# Patient Record
Sex: MALE | Race: WHITE | NOT HISPANIC OR LATINO | Employment: FULL TIME | URBAN - METROPOLITAN AREA
[De-identification: names, ages, dates, MRNs, and addresses within clinical notes are randomized per-mention and may not be internally consistent; named-entity substitution may affect disease eponyms.]

---

## 2020-02-28 ENCOUNTER — TELEPHONE (OUTPATIENT)
Dept: ORTHOPEDICS | Facility: CLINIC | Age: 30
End: 2020-02-28

## 2020-02-28 DIAGNOSIS — M25.552 BILATERAL HIP PAIN: Primary | ICD-10-CM

## 2020-02-28 DIAGNOSIS — M25.551 BILATERAL HIP PAIN: Primary | ICD-10-CM

## 2020-02-28 NOTE — TELEPHONE ENCOUNTER
I spoke to the patient in regards to his ortho appointment. I confirmed the extremity that the patient was being seen for. I informed the patient to arrive 30 minutes prior to his scheduled appointment time for xrays.   The patient verbalized understanding. MS

## 2020-03-05 ENCOUNTER — HOSPITAL ENCOUNTER (OUTPATIENT)
Dept: RADIOLOGY | Facility: HOSPITAL | Age: 30
Discharge: HOME | End: 2020-03-05
Attending: ORTHOPAEDIC SURGERY
Payer: COMMERCIAL

## 2020-03-05 ENCOUNTER — OFFICE VISIT (OUTPATIENT)
Dept: ORTHOPEDICS | Facility: CLINIC | Age: 30
End: 2020-03-05
Payer: COMMERCIAL

## 2020-03-05 VITALS
SYSTOLIC BLOOD PRESSURE: 141 MMHG | HEIGHT: 70 IN | WEIGHT: 158 LBS | DIASTOLIC BLOOD PRESSURE: 69 MMHG | HEART RATE: 72 BPM | BODY MASS INDEX: 22.62 KG/M2

## 2020-03-05 DIAGNOSIS — M25.551 BILATERAL HIP PAIN: ICD-10-CM

## 2020-03-05 DIAGNOSIS — M25.552 BILATERAL HIP PAIN: ICD-10-CM

## 2020-03-05 DIAGNOSIS — S73.191D TEAR OF RIGHT ACETABULAR LABRUM, SUBSEQUENT ENCOUNTER: ICD-10-CM

## 2020-03-05 DIAGNOSIS — M25.851 FEMOROACETABULAR IMPINGEMENT OF RIGHT HIP: Primary | ICD-10-CM

## 2020-03-05 PROCEDURE — 73521 X-RAY EXAM HIPS BI 2 VIEWS: CPT | Mod: 26,,, | Performed by: RADIOLOGY

## 2020-03-05 PROCEDURE — 3008F PR BODY MASS INDEX (BMI) DOCUMENTED: ICD-10-PCS | Mod: CPTII,S$GLB,, | Performed by: ORTHOPAEDIC SURGERY

## 2020-03-05 PROCEDURE — 99214 PR OFFICE/OUTPT VISIT, EST, LEVL IV, 30-39 MIN: ICD-10-PCS | Mod: S$GLB,,, | Performed by: ORTHOPAEDIC SURGERY

## 2020-03-05 PROCEDURE — 73521 XR HIPS BILATERAL 2 VIEW INCL AP PELVIS: ICD-10-PCS | Mod: 26,,, | Performed by: RADIOLOGY

## 2020-03-05 PROCEDURE — 99999 PR PBB SHADOW E&M-EST. PATIENT-LVL III: ICD-10-PCS | Mod: PBBFAC,,, | Performed by: ORTHOPAEDIC SURGERY

## 2020-03-05 PROCEDURE — 3008F BODY MASS INDEX DOCD: CPT | Mod: CPTII,S$GLB,, | Performed by: ORTHOPAEDIC SURGERY

## 2020-03-05 PROCEDURE — 99214 OFFICE O/P EST MOD 30 MIN: CPT | Mod: S$GLB,,, | Performed by: ORTHOPAEDIC SURGERY

## 2020-03-05 PROCEDURE — 73521 X-RAY EXAM HIPS BI 2 VIEWS: CPT | Mod: TC

## 2020-03-05 PROCEDURE — 99999 PR PBB SHADOW E&M-EST. PATIENT-LVL III: CPT | Mod: PBBFAC,,, | Performed by: ORTHOPAEDIC SURGERY

## 2020-03-05 NOTE — PROGRESS NOTES
Patient ID: Clinton Combs  YOB: 1990  MRN: [id number]    Chief Complaint: Pain of the Right Hip    Referred By: [facility]     History of Present Illness: Clinton Combs is a right-hand dominant 29 y.o. male  with right hip/groin pain. Seen by us previously [facility]. Did PT with Yogesh Burns @ [facility] for 6 weeks, limited relief. Had ultrasound guided right hip corticosteroid injection that helped for a few weeks. Currently, pain is most severe with jumping or squats, cutting, or sharp turns.  Wants to play volleyball this spring.    Hip Pain    The pain is present in the right hip. This is a recurrent problem. The current episode started more than 1 year ago. The problem occurs intermittently. The problem has been gradually worsening. The quality of the pain is described as aching (stabbing). The pain is at a severity of 2/10. Pertinent negatives include no fever or numbness. The symptoms are aggravated by activity. He has tried injection treatment for the symptoms. The treatment provided mild relief. Physical therapy was effective.      Past Medical History:   History reviewed. No pertinent past medical history.  Past Surgical History:   Procedure Laterality Date    back injections       History reviewed. No pertinent family history.  Social History     Socioeconomic History    Marital status: Single     Spouse name: Not on file    Number of children: Not on file    Years of education: Not on file    Highest education level: Not on file   Occupational History    Not on file   Social Needs    Financial resource strain: Not on file    Food insecurity:     Worry: Not on file     Inability: Not on file    Transportation needs:     Medical: Not on file     Non-medical: Not on file   Tobacco Use    Smoking status: Never Smoker    Smokeless tobacco: Never Used   Substance and Sexual Activity    Alcohol use: Never     Frequency: Never    Drug  use: Never    Sexual activity: Not on file   Lifestyle    Physical activity:     Days per week: Not on file     Minutes per session: Not on file    Stress: Not on file   Relationships    Social connections:     Talks on phone: Not on file     Gets together: Not on file     Attends Yazidism service: Not on file     Active member of club or organization: Not on file     Attends meetings of clubs or organizations: Not on file     Relationship status: Not on file   Other Topics Concern    Not on file   Social History Narrative    Not on file       Review of patient's allergies indicates:  No Known Allergies  Review of Systems   Constitution: Negative for chills and fever.   HENT: Negative for ear discharge and hearing loss.    Eyes: Negative for blurred vision and visual disturbance.   Cardiovascular: Negative for chest pain and leg swelling.   Respiratory: Negative for cough and shortness of breath.    Endocrine: Negative for polyuria.   Hematologic/Lymphatic: Negative for bleeding problem.   Skin: Negative for rash.   Musculoskeletal: Positive for joint pain. Negative for back pain, joint swelling, muscle cramps and muscle weakness.   Gastrointestinal: Negative for nausea and vomiting.   Genitourinary: Negative for hematuria.   Neurological: Negative for loss of balance, numbness and paresthesias.   Psychiatric/Behavioral: Negative for altered mental status.       Physical Exam:   Body mass index is 22.67 kg/m².  Vitals:    03/05/20 0946   BP: (!) 141/69   Pulse: 72      GENERAL: Well appearing, appropriate for stated age, no acute distress.  CARDIOVASCULAR: Pulses regular by peripheral palpation.  PULMONARY: Respirations are even and non-labored.  NEURO: Awake, alert, and oriented x 3.  PSYCH: Mood & affect are appropriate.  HEENT: Head is normocephalic and atraumatic.  Ortho/SPM Exam  Right hip: + impingement test, negative GABY, flexion past 100, IR 20, ER 60, no pain with resisted hip flexion, no pain with  logroll, no pain with circumduction, no pain with abduction, NVID  Left hip: Range of motion within normal limits and painless. Intact motor and sensation. Good perfusion. No tenderness, gross deformity, gross instability, or skin lesions.  Gait: reciprocal heel-toe    Imaging:    X-Ray Hips Bilateral 2 View Incl AP Pelvis  Narrative: EXAMINATION:  XR HIPS BILATERAL 2 VIEW INCL AP PELVIS    CLINICAL HISTORY:  Pain in right hip    TECHNIQUE:  AP view of the pelvis and frogleg lateral views of both hips were performed.    COMPARISON:  None.    FINDINGS:  No acute osseous or soft tissue abnormality.  Impression: As above    Electronically signed by: Sumit Pizano MD  Date:    03/05/2020  Time:    09:45    Relevant imaging results reviewed and interpreted by me, discussed with the patient and / or family today.     Other Tests:     No other tests performed today.    Assessment:  Clinton Combs is a 29 y.o. male with right hip pain, previous patient [facility]    Encounter Diagnoses   Name Primary?    Femoroacetabular impingement of right hip Yes    Tear of right acetabular labrum, subsequent encounter         Plan:  · Schedule for US guided right hip injection  · Continue HEP based on previous PT  · Bring records from [facility] including clinic notes, cd with MRI and x-rays, and radiologist interpretation at next visit   Treatment plan was developed with input from the patient/family, and they expressed understanding and agreement with the plan. All questions were answered today.    Follow-up:  Ultrasound injection or sooner if there are any problems between now and then.    Disclaimer: This note was prepared using a voice recognition system and is likely to have sound alike errors within the text.

## 2020-03-05 NOTE — PATIENT INSTRUCTIONS
Dx: Right hip STEFF and labral tear    Plan:  · Schedule for US guided right hip injection  · Continue HEP based on previous PT  · Bring records from [facility] including clinic notes, cd with MRI and x-rays, and radiologist interpretation at next visit    Thank you for choosing [facility] and Dr. Denver Albert for your orthopedic & sports medicine care. It is our goal to provide you with exceptional care that will help keep you healthy, active, and get you back in the game.    If you felt that you received exemplary care today, please consider leaving us feedback on [employer] at [email or web address]     Please do not hesitate to reach out to us via email, phone, or MyChart with any questions, concerns, or feedback.    If you are experiencing pain/discomfort or have questions after 5pm and would like to be connected to the [facility] on call team, please call this number [phone] and specify which Orthopedics/Sports Medicine provider is treating you.

## 2020-03-11 ENCOUNTER — PATIENT MESSAGE (OUTPATIENT)
Dept: ORTHOPEDICS | Facility: CLINIC | Age: 30
End: 2020-03-11

## 2020-03-11 ENCOUNTER — PROCEDURE VISIT (OUTPATIENT)
Dept: ORTHOPEDICS | Facility: CLINIC | Age: 30
End: 2020-03-11
Payer: COMMERCIAL

## 2020-03-11 VITALS
HEIGHT: 70 IN | DIASTOLIC BLOOD PRESSURE: 80 MMHG | SYSTOLIC BLOOD PRESSURE: 118 MMHG | HEART RATE: 63 BPM | BODY MASS INDEX: 22.62 KG/M2 | WEIGHT: 158 LBS

## 2020-03-11 DIAGNOSIS — M25.851 HIP IMPINGEMENT SYNDROME, RIGHT: Primary | ICD-10-CM

## 2020-03-11 PROCEDURE — 20611 LARGE JOINT ASPIRATION/INJECTION: R HIP JOINT: ICD-10-PCS | Mod: RT,S$GLB,, | Performed by: ORTHOPAEDIC SURGERY

## 2020-03-11 PROCEDURE — 20611 DRAIN/INJ JOINT/BURSA W/US: CPT | Mod: RT,S$GLB,, | Performed by: ORTHOPAEDIC SURGERY

## 2020-03-11 RX ORDER — METHYLPREDNISOLONE ACETATE 80 MG/ML
80 INJECTION, SUSPENSION INTRA-ARTICULAR; INTRALESIONAL; INTRAMUSCULAR; SOFT TISSUE
Status: DISCONTINUED | OUTPATIENT
Start: 2020-03-11 | End: 2020-03-11 | Stop reason: HOSPADM

## 2020-03-11 RX ADMIN — METHYLPREDNISOLONE ACETATE 80 MG: 80 INJECTION, SUSPENSION INTRA-ARTICULAR; INTRALESIONAL; INTRAMUSCULAR; SOFT TISSUE at 08:03

## 2020-03-11 NOTE — PROCEDURES
Large Joint Aspiration/Injection: R hip joint  Performed by: Denver Albert MD  Authorized by: Denver Albert MD  Date/Time: 3/11/2020 8:00 AM    Consent Done?:  Yes (Verbal)  Indications:  diagnostic evaluation and pain  Timeout: Immediately prior to procedure a time out was called to verify the correct patient, procedure, equipment, support staff and site/side marked as required.  Prep:Patient was prepped and draped in the usual sterile fashion.        Anesthesia  Local anesthesia used  Anesthetic: bupivacaine 0.5% without epinephrine, lidocaine 1% without epinephrine and topical anesthetic    Details:   Needle size: 20 G  Ultrasonic Guidance for needle placement: Yes  Images are saved and documented.   Approach: lateral  Location:  Hip  Site:  R hip joint    Medications: 80 mg methylPREDNISolone acetate 80 mg/mL  Patient tolerance:  patient tolerated the procedure well with no immediate complications    Ultrasound Guided  2cc 1% lidocaine plain, 2cc 0.5% marcaine plain, 1cc 80mg methylprednisolone    ULTRASOUND NOTE: Due to the complex nature of the anatomy, and the need to insure the needle was placed precisely at the desired anatomical location, this procedure was performed with the assistance of ultrasound guidance. Imaging demonstrating needle trajectory and placement was saved on the ultrasound device for documentation purposes.

## 2021-02-11 ENCOUNTER — OFFICE VISIT (OUTPATIENT)
Dept: PRIMARY CARE CLINIC | Facility: CLINIC | Age: 31
End: 2021-02-11
Payer: COMMERCIAL

## 2021-02-11 ENCOUNTER — TELEPHONE (OUTPATIENT)
Dept: ORTHOPEDICS | Facility: CLINIC | Age: 31
End: 2021-02-11

## 2021-02-11 ENCOUNTER — LAB VISIT (OUTPATIENT)
Dept: LAB | Facility: HOSPITAL | Age: 31
End: 2021-02-11
Attending: FAMILY MEDICINE
Payer: COMMERCIAL

## 2021-02-11 VITALS
SYSTOLIC BLOOD PRESSURE: 126 MMHG | HEART RATE: 78 BPM | WEIGHT: 162.69 LBS | DIASTOLIC BLOOD PRESSURE: 84 MMHG | TEMPERATURE: 97 F | BODY MASS INDEX: 23.29 KG/M2 | HEIGHT: 70 IN

## 2021-02-11 DIAGNOSIS — M51.37 DDD (DEGENERATIVE DISC DISEASE), LUMBOSACRAL: ICD-10-CM

## 2021-02-11 DIAGNOSIS — Z00.00 ROUTINE GENERAL MEDICAL EXAMINATION AT A HEALTH CARE FACILITY: ICD-10-CM

## 2021-02-11 DIAGNOSIS — R63.4 WEIGHT LOSS: ICD-10-CM

## 2021-02-11 DIAGNOSIS — M25.512 ACUTE PAIN OF LEFT SHOULDER: ICD-10-CM

## 2021-02-11 DIAGNOSIS — R41.840 ATTENTION AND CONCENTRATION DEFICIT: Primary | ICD-10-CM

## 2021-02-11 LAB
BASOPHILS # BLD AUTO: 0.12 K/UL (ref 0–0.2)
BASOPHILS NFR BLD: 1.8 % (ref 0–1.9)
DIFFERENTIAL METHOD: ABNORMAL
EOSINOPHIL # BLD AUTO: 0.2 K/UL (ref 0–0.5)
EOSINOPHIL NFR BLD: 2.6 % (ref 0–8)
ERYTHROCYTE [DISTWIDTH] IN BLOOD BY AUTOMATED COUNT: 13.2 % (ref 11.5–14.5)
HCT VFR BLD AUTO: 49.4 % (ref 40–54)
HGB BLD-MCNC: 16.1 G/DL (ref 14–18)
IMM GRANULOCYTES # BLD AUTO: 0.04 K/UL (ref 0–0.04)
IMM GRANULOCYTES NFR BLD AUTO: 0.6 % (ref 0–0.5)
LYMPHOCYTES # BLD AUTO: 2.7 K/UL (ref 1–4.8)
LYMPHOCYTES NFR BLD: 40.9 % (ref 18–48)
MCH RBC QN AUTO: 30.4 PG (ref 27–31)
MCHC RBC AUTO-ENTMCNC: 32.6 G/DL (ref 32–36)
MCV RBC AUTO: 93 FL (ref 82–98)
MONOCYTES # BLD AUTO: 0.6 K/UL (ref 0.3–1)
MONOCYTES NFR BLD: 9.1 % (ref 4–15)
NEUTROPHILS # BLD AUTO: 2.9 K/UL (ref 1.8–7.7)
NEUTROPHILS NFR BLD: 45 % (ref 38–73)
NRBC BLD-RTO: 0 /100 WBC
PLATELET # BLD AUTO: 312 K/UL (ref 150–350)
PMV BLD AUTO: 9.7 FL (ref 9.2–12.9)
RBC # BLD AUTO: 5.3 M/UL (ref 4.6–6.2)
WBC # BLD AUTO: 6.51 K/UL (ref 3.9–12.7)

## 2021-02-11 PROCEDURE — 99999 PR PBB SHADOW E&M-EST. PATIENT-LVL III: ICD-10-PCS | Mod: PBBFAC,,, | Performed by: FAMILY MEDICINE

## 2021-02-11 PROCEDURE — 80053 COMPREHEN METABOLIC PANEL: CPT

## 2021-02-11 PROCEDURE — 99204 OFFICE O/P NEW MOD 45 MIN: CPT | Mod: 25,S$GLB,, | Performed by: FAMILY MEDICINE

## 2021-02-11 PROCEDURE — 85025 COMPLETE CBC W/AUTO DIFF WBC: CPT

## 2021-02-11 PROCEDURE — 80061 LIPID PANEL: CPT

## 2021-02-11 PROCEDURE — 90686 FLU VACCINE (QUAD) GREATER THAN OR EQUAL TO 3YO PRESERVATIVE FREE IM: ICD-10-PCS | Mod: S$GLB,,, | Performed by: FAMILY MEDICINE

## 2021-02-11 PROCEDURE — 99999 PR PBB SHADOW E&M-EST. PATIENT-LVL III: CPT | Mod: PBBFAC,,, | Performed by: FAMILY MEDICINE

## 2021-02-11 PROCEDURE — 90471 FLU VACCINE (QUAD) GREATER THAN OR EQUAL TO 3YO PRESERVATIVE FREE IM: ICD-10-PCS | Mod: S$GLB,,, | Performed by: FAMILY MEDICINE

## 2021-02-11 PROCEDURE — 1126F PR PAIN SEVERITY QUANTIFIED, NO PAIN PRESENT: ICD-10-PCS | Mod: S$GLB,,, | Performed by: FAMILY MEDICINE

## 2021-02-11 PROCEDURE — 3008F PR BODY MASS INDEX (BMI) DOCUMENTED: ICD-10-PCS | Mod: CPTII,S$GLB,, | Performed by: FAMILY MEDICINE

## 2021-02-11 PROCEDURE — 90686 IIV4 VACC NO PRSV 0.5 ML IM: CPT | Mod: S$GLB,,, | Performed by: FAMILY MEDICINE

## 2021-02-11 PROCEDURE — 3008F BODY MASS INDEX DOCD: CPT | Mod: CPTII,S$GLB,, | Performed by: FAMILY MEDICINE

## 2021-02-11 PROCEDURE — 99204 PR OFFICE/OUTPT VISIT, NEW, LEVL IV, 45-59 MIN: ICD-10-PCS | Mod: 25,S$GLB,, | Performed by: FAMILY MEDICINE

## 2021-02-11 PROCEDURE — 36415 COLL VENOUS BLD VENIPUNCTURE: CPT | Mod: PN

## 2021-02-11 PROCEDURE — 84443 ASSAY THYROID STIM HORMONE: CPT

## 2021-02-11 PROCEDURE — 1126F AMNT PAIN NOTED NONE PRSNT: CPT | Mod: S$GLB,,, | Performed by: FAMILY MEDICINE

## 2021-02-11 PROCEDURE — 90471 IMMUNIZATION ADMIN: CPT | Mod: S$GLB,,, | Performed by: FAMILY MEDICINE

## 2021-02-11 RX ORDER — SERTRALINE HYDROCHLORIDE 50 MG/1
25 TABLET, FILM COATED ORAL DAILY PRN
COMMUNITY
End: 2021-02-11

## 2021-02-12 ENCOUNTER — TELEPHONE (OUTPATIENT)
Dept: ORTHOPEDICS | Facility: CLINIC | Age: 31
End: 2021-02-12

## 2021-02-12 DIAGNOSIS — M25.512 ACUTE PAIN OF LEFT SHOULDER: Primary | ICD-10-CM

## 2021-02-12 LAB
ALBUMIN SERPL BCP-MCNC: 4.6 G/DL (ref 3.5–5.2)
ALP SERPL-CCNC: 62 U/L (ref 55–135)
ALT SERPL W/O P-5'-P-CCNC: 22 U/L (ref 10–44)
ANION GAP SERPL CALC-SCNC: 11 MMOL/L (ref 8–16)
AST SERPL-CCNC: 23 U/L (ref 10–40)
BILIRUB SERPL-MCNC: 0.4 MG/DL (ref 0.1–1)
BUN SERPL-MCNC: 13 MG/DL (ref 6–20)
CALCIUM SERPL-MCNC: 9.6 MG/DL (ref 8.7–10.5)
CHLORIDE SERPL-SCNC: 102 MMOL/L (ref 95–110)
CHOLEST SERPL-MCNC: 177 MG/DL (ref 120–199)
CHOLEST/HDLC SERPL: 2.4 {RATIO} (ref 2–5)
CO2 SERPL-SCNC: 26 MMOL/L (ref 23–29)
CREAT SERPL-MCNC: 1.1 MG/DL (ref 0.5–1.4)
EST. GFR  (AFRICAN AMERICAN): >60 ML/MIN/1.73 M^2
EST. GFR  (NON AFRICAN AMERICAN): >60 ML/MIN/1.73 M^2
GLUCOSE SERPL-MCNC: 77 MG/DL (ref 70–110)
HDLC SERPL-MCNC: 74 MG/DL (ref 40–75)
HDLC SERPL: 41.8 % (ref 20–50)
LDLC SERPL CALC-MCNC: 94.4 MG/DL (ref 63–159)
NONHDLC SERPL-MCNC: 103 MG/DL
POTASSIUM SERPL-SCNC: 4.4 MMOL/L (ref 3.5–5.1)
PROT SERPL-MCNC: 7.7 G/DL (ref 6–8.4)
SODIUM SERPL-SCNC: 139 MMOL/L (ref 136–145)
TRIGL SERPL-MCNC: 43 MG/DL (ref 30–150)
TSH SERPL DL<=0.005 MIU/L-ACNC: 1.86 UIU/ML (ref 0.4–4)

## 2021-02-17 ENCOUNTER — TELEPHONE (OUTPATIENT)
Dept: ORTHOPEDICS | Facility: CLINIC | Age: 31
End: 2021-02-17

## 2021-02-17 ENCOUNTER — OFFICE VISIT (OUTPATIENT)
Dept: ORTHOPEDICS | Facility: CLINIC | Age: 31
End: 2021-02-17
Payer: COMMERCIAL

## 2021-02-17 ENCOUNTER — HOSPITAL ENCOUNTER (OUTPATIENT)
Dept: RADIOLOGY | Facility: HOSPITAL | Age: 31
Discharge: HOME | End: 2021-02-17
Attending: PHYSICIAN ASSISTANT
Payer: COMMERCIAL

## 2021-02-17 VITALS — BODY MASS INDEX: 23.19 KG/M2 | HEIGHT: 70 IN | WEIGHT: 162 LBS

## 2021-02-17 DIAGNOSIS — M25.519 SHOULDER PAIN, UNSPECIFIED CHRONICITY, UNSPECIFIED LATERALITY: Primary | ICD-10-CM

## 2021-02-17 DIAGNOSIS — M25.512 ACUTE PAIN OF LEFT SHOULDER: ICD-10-CM

## 2021-02-17 PROCEDURE — 73030 X-RAY EXAM OF SHOULDER: CPT | Mod: TC,LT

## 2021-02-17 PROCEDURE — 73030 XR SHOULDER COMPLETE 2 OR MORE VIEWS LEFT: ICD-10-PCS | Mod: 26,LT,, | Performed by: RADIOLOGY

## 2021-02-17 PROCEDURE — 73030 X-RAY EXAM OF SHOULDER: CPT | Mod: 26,LT,, | Performed by: RADIOLOGY

## 2021-02-17 PROCEDURE — 3008F PR BODY MASS INDEX (BMI) DOCUMENTED: ICD-10-PCS | Mod: CPTII,S$GLB,, | Performed by: PHYSICIAN ASSISTANT

## 2021-02-17 PROCEDURE — 99999 PR PBB SHADOW E&M-EST. PATIENT-LVL IV: CPT | Mod: PBBFAC,,, | Performed by: PHYSICIAN ASSISTANT

## 2021-02-17 PROCEDURE — 99999 PR PBB SHADOW E&M-EST. PATIENT-LVL IV: ICD-10-PCS | Mod: PBBFAC,,, | Performed by: PHYSICIAN ASSISTANT

## 2021-02-17 PROCEDURE — 99213 OFFICE O/P EST LOW 20 MIN: CPT | Mod: S$GLB,,, | Performed by: PHYSICIAN ASSISTANT

## 2021-02-17 PROCEDURE — 1125F AMNT PAIN NOTED PAIN PRSNT: CPT | Mod: S$GLB,,, | Performed by: PHYSICIAN ASSISTANT

## 2021-02-17 PROCEDURE — 1125F PR PAIN SEVERITY QUANTIFIED, PAIN PRESENT: ICD-10-PCS | Mod: S$GLB,,, | Performed by: PHYSICIAN ASSISTANT

## 2021-02-17 PROCEDURE — 99213 PR OFFICE/OUTPT VISIT, EST, LEVL III, 20-29 MIN: ICD-10-PCS | Mod: S$GLB,,, | Performed by: PHYSICIAN ASSISTANT

## 2021-02-17 PROCEDURE — 3008F BODY MASS INDEX DOCD: CPT | Mod: CPTII,S$GLB,, | Performed by: PHYSICIAN ASSISTANT

## 2021-03-01 ENCOUNTER — OFFICE VISIT (OUTPATIENT)
Dept: PSYCHIATRY | Facility: CLINIC | Age: 31
End: 2021-03-01
Payer: COMMERCIAL

## 2021-03-01 VITALS — DIASTOLIC BLOOD PRESSURE: 90 MMHG | SYSTOLIC BLOOD PRESSURE: 143 MMHG | HEART RATE: 74 BPM

## 2021-03-01 DIAGNOSIS — F41.0 GENERALIZED ANXIETY DISORDER WITH PANIC ATTACKS: Primary | ICD-10-CM

## 2021-03-01 DIAGNOSIS — F41.9 ANXIETY: ICD-10-CM

## 2021-03-01 DIAGNOSIS — R41.840 ATTENTION AND CONCENTRATION DEFICIT: ICD-10-CM

## 2021-03-01 DIAGNOSIS — F41.1 GENERALIZED ANXIETY DISORDER WITH PANIC ATTACKS: Primary | ICD-10-CM

## 2021-03-01 PROCEDURE — 99999 PR PBB SHADOW E&M-EST. PATIENT-LVL III: ICD-10-PCS | Mod: PBBFAC,,, | Performed by: NURSE PRACTITIONER

## 2021-03-01 PROCEDURE — 99999 PR PBB SHADOW E&M-EST. PATIENT-LVL III: CPT | Mod: PBBFAC,,, | Performed by: NURSE PRACTITIONER

## 2021-03-01 PROCEDURE — 90792 PR PSYCHIATRIC DIAGNOSTIC EVALUATION W/MEDICAL SERVICES: ICD-10-PCS | Mod: S$GLB,,, | Performed by: NURSE PRACTITIONER

## 2021-03-01 PROCEDURE — 90792 PSYCH DIAG EVAL W/MED SRVCS: CPT | Mod: S$GLB,,, | Performed by: NURSE PRACTITIONER

## 2021-03-01 RX ORDER — HYDROXYZINE HYDROCHLORIDE 10 MG/1
TABLET, FILM COATED ORAL
Qty: 60 TABLET | Refills: 1 | Status: SHIPPED | OUTPATIENT
Start: 2021-03-01 | End: 2022-09-20 | Stop reason: ALTCHOICE

## 2021-03-05 ENCOUNTER — OFFICE VISIT (OUTPATIENT)
Dept: PSYCHIATRY | Facility: CLINIC | Age: 31
End: 2021-03-05
Payer: COMMERCIAL

## 2021-03-05 DIAGNOSIS — F90.0 ADHD (ATTENTION DEFICIT HYPERACTIVITY DISORDER), INATTENTIVE TYPE: Primary | ICD-10-CM

## 2021-03-05 DIAGNOSIS — F41.0 GENERALIZED ANXIETY DISORDER WITH PANIC ATTACKS: ICD-10-CM

## 2021-03-05 DIAGNOSIS — F41.9 ANXIETY: ICD-10-CM

## 2021-03-05 DIAGNOSIS — R41.840 ATTENTION AND CONCENTRATION DEFICIT: ICD-10-CM

## 2021-03-05 DIAGNOSIS — F41.1 GENERALIZED ANXIETY DISORDER WITH PANIC ATTACKS: ICD-10-CM

## 2021-03-05 PROCEDURE — 99214 OFFICE O/P EST MOD 30 MIN: CPT | Mod: 95,,, | Performed by: NURSE PRACTITIONER

## 2021-03-05 PROCEDURE — 99214 PR OFFICE/OUTPT VISIT, EST, LEVL IV, 30-39 MIN: ICD-10-PCS | Mod: 95,,, | Performed by: NURSE PRACTITIONER

## 2021-03-05 RX ORDER — LISDEXAMFETAMINE DIMESYLATE CAPSULES 10 MG/1
10 CAPSULE ORAL DAILY
Qty: 30 CAPSULE | Refills: 0 | Status: SHIPPED | OUTPATIENT
Start: 2021-03-05 | End: 2022-01-06 | Stop reason: SDUPTHER

## 2021-03-09 ENCOUNTER — HOSPITAL ENCOUNTER (OUTPATIENT)
Dept: RADIOLOGY | Facility: HOSPITAL | Age: 31
Discharge: HOME | End: 2021-03-09
Attending: PHYSICIAN ASSISTANT
Payer: COMMERCIAL

## 2021-03-09 DIAGNOSIS — M25.512 ACUTE PAIN OF LEFT SHOULDER: ICD-10-CM

## 2021-03-09 PROCEDURE — 25500020 PHARM REV CODE 255: Performed by: PHYSICIAN ASSISTANT

## 2021-03-09 PROCEDURE — 73222 MRI JOINT UPR EXTREM W/DYE: CPT | Mod: 26,LT,, | Performed by: RADIOLOGY

## 2021-03-09 PROCEDURE — 73040 CONTRAST X-RAY OF SHOULDER: CPT | Mod: 26,LT,, | Performed by: RADIOLOGY

## 2021-03-09 PROCEDURE — 23350 INJECTION FOR SHOULDER X-RAY: CPT | Mod: ,,, | Performed by: RADIOLOGY

## 2021-03-09 PROCEDURE — 73040 XR ARTHROGRAM SHOULDER LEFT WITH MRI TO FOLLOW: ICD-10-PCS | Mod: 26,LT,, | Performed by: RADIOLOGY

## 2021-03-09 PROCEDURE — 73222 MRI ARTHROGRAM SHOULDER WITH CONTRAST LEFT: ICD-10-PCS | Mod: 26,LT,, | Performed by: RADIOLOGY

## 2021-03-09 PROCEDURE — 23350 INJECTION FOR SHOULDER X-RAY: CPT

## 2021-03-09 PROCEDURE — 23350 XR ARTHROGRAM SHOULDER LEFT WITH MRI TO FOLLOW: ICD-10-PCS | Mod: ,,, | Performed by: RADIOLOGY

## 2021-03-09 PROCEDURE — A9585 GADOBUTROL INJECTION: HCPCS | Performed by: PHYSICIAN ASSISTANT

## 2021-03-09 PROCEDURE — 73222 MRI JOINT UPR EXTREM W/DYE: CPT | Mod: TC,LT

## 2021-03-09 RX ORDER — LIDOCAINE HYDROCHLORIDE 10 MG/ML
1 INJECTION INFILTRATION; PERINEURAL ONCE
Status: DISCONTINUED | OUTPATIENT
Start: 2021-03-09 | End: 2021-03-10 | Stop reason: HOSPADM

## 2021-03-09 RX ORDER — GADOBUTROL 604.72 MG/ML
0.1 INJECTION INTRAVENOUS
Status: COMPLETED | OUTPATIENT
Start: 2021-03-09 | End: 2021-03-09

## 2021-03-09 RX ADMIN — GADOBUTROL 0.1 ML: 604.72 INJECTION INTRAVENOUS at 12:03

## 2021-03-09 RX ADMIN — IOHEXOL 10 ML: 350 INJECTION, SOLUTION INTRAVENOUS at 12:03

## 2021-03-11 ENCOUNTER — OFFICE VISIT (OUTPATIENT)
Dept: ORTHOPEDICS | Facility: CLINIC | Age: 31
End: 2021-03-11
Payer: COMMERCIAL

## 2021-03-11 DIAGNOSIS — M75.22 BICEPS TENDONITIS ON LEFT: Primary | ICD-10-CM

## 2021-03-11 PROCEDURE — 1125F AMNT PAIN NOTED PAIN PRSNT: CPT | Mod: S$GLB,,, | Performed by: ORTHOPAEDIC SURGERY

## 2021-03-11 PROCEDURE — 99999 PR PBB SHADOW E&M-EST. PATIENT-LVL III: ICD-10-PCS | Mod: PBBFAC,,, | Performed by: ORTHOPAEDIC SURGERY

## 2021-03-11 PROCEDURE — 1125F PR PAIN SEVERITY QUANTIFIED, PAIN PRESENT: ICD-10-PCS | Mod: S$GLB,,, | Performed by: ORTHOPAEDIC SURGERY

## 2021-03-11 PROCEDURE — 99213 OFFICE O/P EST LOW 20 MIN: CPT | Mod: S$GLB,,, | Performed by: ORTHOPAEDIC SURGERY

## 2021-03-11 PROCEDURE — 99213 PR OFFICE/OUTPT VISIT, EST, LEVL III, 20-29 MIN: ICD-10-PCS | Mod: S$GLB,,, | Performed by: ORTHOPAEDIC SURGERY

## 2021-03-11 PROCEDURE — 99999 PR PBB SHADOW E&M-EST. PATIENT-LVL III: CPT | Mod: PBBFAC,,, | Performed by: ORTHOPAEDIC SURGERY

## 2021-03-16 ENCOUNTER — TELEPHONE (OUTPATIENT)
Dept: ORTHOPEDICS | Facility: CLINIC | Age: 31
End: 2021-03-16

## 2021-03-27 ENCOUNTER — IMMUNIZATION (OUTPATIENT)
Dept: PRIMARY CARE CLINIC | Facility: CLINIC | Age: 31
End: 2021-03-27
Payer: COMMERCIAL

## 2021-03-27 DIAGNOSIS — Z23 NEED FOR VACCINATION: Primary | ICD-10-CM

## 2021-03-27 PROCEDURE — 0011A PR IMMUNIZ ADMIN, SARS-COV-2 COVID-19 VACC, 100MCG/0.5ML, 1ST DOSE: ICD-10-PCS | Mod: CV19,S$GLB,, | Performed by: INTERNAL MEDICINE

## 2021-03-27 PROCEDURE — 91301 PR SARS-COV-2 COVID-19 VACCINE, NO PRSV, 100MCG/0.5ML, IM: CPT | Mod: S$GLB,,, | Performed by: INTERNAL MEDICINE

## 2021-03-27 PROCEDURE — 0011A PR IMMUNIZ ADMIN, SARS-COV-2 COVID-19 VACC, 100MCG/0.5ML, 1ST DOSE: CPT | Mod: CV19,S$GLB,, | Performed by: INTERNAL MEDICINE

## 2021-03-27 PROCEDURE — 91301 PR SARS-COV-2 COVID-19 VACCINE, NO PRSV, 100MCG/0.5ML, IM: ICD-10-PCS | Mod: S$GLB,,, | Performed by: INTERNAL MEDICINE

## 2021-03-27 RX ADMIN — Medication 0.5 ML: at 10:03

## 2021-04-25 ENCOUNTER — IMMUNIZATION (OUTPATIENT)
Dept: PRIMARY CARE CLINIC | Facility: CLINIC | Age: 31
End: 2021-04-25
Payer: COMMERCIAL

## 2021-04-25 DIAGNOSIS — Z23 NEED FOR VACCINATION: Primary | ICD-10-CM

## 2021-04-25 PROCEDURE — 91301 PR SARS-COV-2 COVID-19 VACCINE, NO PRSV, 100MCG/0.5ML, IM: CPT | Mod: S$GLB,,, | Performed by: INTERNAL MEDICINE

## 2021-04-25 PROCEDURE — 0012A PR IMMUNIZ ADMIN, SARS-COV-2 COVID-19 VACC, 100MCG/0.5ML, 2ND DOSE: CPT | Mod: CV19,S$GLB,, | Performed by: INTERNAL MEDICINE

## 2021-04-25 PROCEDURE — 0012A PR IMMUNIZ ADMIN, SARS-COV-2 COVID-19 VACC, 100MCG/0.5ML, 2ND DOSE: ICD-10-PCS | Mod: CV19,S$GLB,, | Performed by: INTERNAL MEDICINE

## 2021-04-25 PROCEDURE — 91301 PR SARS-COV-2 COVID-19 VACCINE, NO PRSV, 100MCG/0.5ML, IM: ICD-10-PCS | Mod: S$GLB,,, | Performed by: INTERNAL MEDICINE

## 2021-04-25 RX ADMIN — Medication 0.5 ML: at 10:04

## 2021-04-27 ENCOUNTER — TELEPHONE (OUTPATIENT)
Dept: ORTHOPEDICS | Facility: CLINIC | Age: 31
End: 2021-04-27

## 2021-05-06 ENCOUNTER — OFFICE VISIT (OUTPATIENT)
Dept: ORTHOPEDICS | Facility: CLINIC | Age: 31
End: 2021-05-06
Payer: COMMERCIAL

## 2021-05-06 VITALS — BODY MASS INDEX: 23.19 KG/M2 | WEIGHT: 162 LBS | HEIGHT: 70 IN

## 2021-05-06 DIAGNOSIS — M75.22 BICEPS TENDONITIS ON LEFT: Primary | ICD-10-CM

## 2021-05-06 PROCEDURE — 99213 PR OFFICE/OUTPT VISIT, EST, LEVL III, 20-29 MIN: ICD-10-PCS | Mod: S$GLB,,, | Performed by: ORTHOPAEDIC SURGERY

## 2021-05-06 PROCEDURE — 3008F PR BODY MASS INDEX (BMI) DOCUMENTED: ICD-10-PCS | Mod: CPTII,S$GLB,, | Performed by: ORTHOPAEDIC SURGERY

## 2021-05-06 PROCEDURE — 1125F PR PAIN SEVERITY QUANTIFIED, PAIN PRESENT: ICD-10-PCS | Mod: S$GLB,,, | Performed by: ORTHOPAEDIC SURGERY

## 2021-05-06 PROCEDURE — 99999 PR PBB SHADOW E&M-EST. PATIENT-LVL III: ICD-10-PCS | Mod: PBBFAC,,, | Performed by: ORTHOPAEDIC SURGERY

## 2021-05-06 PROCEDURE — 99999 PR PBB SHADOW E&M-EST. PATIENT-LVL III: CPT | Mod: PBBFAC,,, | Performed by: ORTHOPAEDIC SURGERY

## 2021-05-06 PROCEDURE — 99213 OFFICE O/P EST LOW 20 MIN: CPT | Mod: S$GLB,,, | Performed by: ORTHOPAEDIC SURGERY

## 2021-05-06 PROCEDURE — 1125F AMNT PAIN NOTED PAIN PRSNT: CPT | Mod: S$GLB,,, | Performed by: ORTHOPAEDIC SURGERY

## 2021-05-06 PROCEDURE — 3008F BODY MASS INDEX DOCD: CPT | Mod: CPTII,S$GLB,, | Performed by: ORTHOPAEDIC SURGERY

## 2021-05-11 ENCOUNTER — OFFICE VISIT (OUTPATIENT)
Dept: ORTHOPEDICS | Facility: CLINIC | Age: 31
End: 2021-05-11
Payer: COMMERCIAL

## 2021-05-11 VITALS — RESPIRATION RATE: 20 BRPM | BODY MASS INDEX: 23.2 KG/M2 | HEIGHT: 70 IN | WEIGHT: 162.06 LBS

## 2021-05-11 DIAGNOSIS — M75.22 BICEPS TENDONITIS ON LEFT: Primary | ICD-10-CM

## 2021-05-11 PROCEDURE — 76942 TENDON SHEATH: ICD-10-PCS | Mod: 26,S$GLB,, | Performed by: STUDENT IN AN ORGANIZED HEALTH CARE EDUCATION/TRAINING PROGRAM

## 2021-05-11 PROCEDURE — 76942 ECHO GUIDE FOR BIOPSY: CPT | Mod: 26,S$GLB,, | Performed by: STUDENT IN AN ORGANIZED HEALTH CARE EDUCATION/TRAINING PROGRAM

## 2021-05-11 PROCEDURE — 99999 PR PBB SHADOW E&M-EST. PATIENT-LVL III: ICD-10-PCS | Mod: PBBFAC,,, | Performed by: STUDENT IN AN ORGANIZED HEALTH CARE EDUCATION/TRAINING PROGRAM

## 2021-05-11 PROCEDURE — 1125F PR PAIN SEVERITY QUANTIFIED, PAIN PRESENT: ICD-10-PCS | Mod: S$GLB,,, | Performed by: STUDENT IN AN ORGANIZED HEALTH CARE EDUCATION/TRAINING PROGRAM

## 2021-05-11 PROCEDURE — 1125F AMNT PAIN NOTED PAIN PRSNT: CPT | Mod: S$GLB,,, | Performed by: STUDENT IN AN ORGANIZED HEALTH CARE EDUCATION/TRAINING PROGRAM

## 2021-05-11 PROCEDURE — 99214 PR OFFICE/OUTPT VISIT, EST, LEVL IV, 30-39 MIN: ICD-10-PCS | Mod: 25,S$GLB,, | Performed by: STUDENT IN AN ORGANIZED HEALTH CARE EDUCATION/TRAINING PROGRAM

## 2021-05-11 PROCEDURE — 99999 PR PBB SHADOW E&M-EST. PATIENT-LVL III: CPT | Mod: PBBFAC,,, | Performed by: STUDENT IN AN ORGANIZED HEALTH CARE EDUCATION/TRAINING PROGRAM

## 2021-05-11 PROCEDURE — 20550 NJX 1 TENDON SHEATH/LIGAMENT: CPT | Mod: LT,S$GLB,, | Performed by: STUDENT IN AN ORGANIZED HEALTH CARE EDUCATION/TRAINING PROGRAM

## 2021-05-11 PROCEDURE — 3008F BODY MASS INDEX DOCD: CPT | Mod: CPTII,S$GLB,, | Performed by: STUDENT IN AN ORGANIZED HEALTH CARE EDUCATION/TRAINING PROGRAM

## 2021-05-11 PROCEDURE — 99214 OFFICE O/P EST MOD 30 MIN: CPT | Mod: 25,S$GLB,, | Performed by: STUDENT IN AN ORGANIZED HEALTH CARE EDUCATION/TRAINING PROGRAM

## 2021-05-11 PROCEDURE — 20550 TENDON SHEATH: ICD-10-PCS | Mod: LT,S$GLB,, | Performed by: STUDENT IN AN ORGANIZED HEALTH CARE EDUCATION/TRAINING PROGRAM

## 2021-05-11 PROCEDURE — 3008F PR BODY MASS INDEX (BMI) DOCUMENTED: ICD-10-PCS | Mod: CPTII,S$GLB,, | Performed by: STUDENT IN AN ORGANIZED HEALTH CARE EDUCATION/TRAINING PROGRAM

## 2021-05-11 RX ORDER — BETAMETHASONE SODIUM PHOSPHATE AND BETAMETHASONE ACETATE 3; 3 MG/ML; MG/ML
3 INJECTION, SUSPENSION INTRA-ARTICULAR; INTRALESIONAL; INTRAMUSCULAR; SOFT TISSUE
Status: DISCONTINUED | OUTPATIENT
Start: 2021-05-11 | End: 2021-05-11 | Stop reason: HOSPADM

## 2021-05-11 RX ADMIN — BETAMETHASONE SODIUM PHOSPHATE AND BETAMETHASONE ACETATE 3 MG: 3; 3 INJECTION, SUSPENSION INTRA-ARTICULAR; INTRALESIONAL; INTRAMUSCULAR; SOFT TISSUE at 02:05

## 2021-06-07 ENCOUNTER — HOSPITAL ENCOUNTER (OUTPATIENT)
Dept: RADIOLOGY | Facility: HOSPITAL | Age: 31
Discharge: HOME | End: 2021-06-07
Attending: PODIATRIST
Payer: COMMERCIAL

## 2021-06-07 ENCOUNTER — OFFICE VISIT (OUTPATIENT)
Dept: PODIATRY | Facility: CLINIC | Age: 31
End: 2021-06-07
Payer: COMMERCIAL

## 2021-06-07 VITALS
SYSTOLIC BLOOD PRESSURE: 125 MMHG | HEART RATE: 68 BPM | WEIGHT: 162 LBS | DIASTOLIC BLOOD PRESSURE: 78 MMHG | BODY MASS INDEX: 23.19 KG/M2 | HEIGHT: 70 IN

## 2021-06-07 DIAGNOSIS — M25.572 TOE JOINT PAIN, LEFT: ICD-10-CM

## 2021-06-07 DIAGNOSIS — Q79.9 ACCESSORY OSSIFICATION CENTER: Primary | ICD-10-CM

## 2021-06-07 PROCEDURE — 73630 XR FOOT COMPLETE 3 VIEW LEFT: ICD-10-PCS | Mod: 26,LT,, | Performed by: RADIOLOGY

## 2021-06-07 PROCEDURE — 99203 OFFICE O/P NEW LOW 30 MIN: CPT | Mod: S$GLB,,, | Performed by: PODIATRIST

## 2021-06-07 PROCEDURE — 99999 PR PBB SHADOW E&M-EST. PATIENT-LVL III: CPT | Mod: PBBFAC,,, | Performed by: PODIATRIST

## 2021-06-07 PROCEDURE — 99203 PR OFFICE/OUTPT VISIT, NEW, LEVL III, 30-44 MIN: ICD-10-PCS | Mod: S$GLB,,, | Performed by: PODIATRIST

## 2021-06-07 PROCEDURE — 1125F PR PAIN SEVERITY QUANTIFIED, PAIN PRESENT: ICD-10-PCS | Mod: S$GLB,,, | Performed by: PODIATRIST

## 2021-06-07 PROCEDURE — 1125F AMNT PAIN NOTED PAIN PRSNT: CPT | Mod: S$GLB,,, | Performed by: PODIATRIST

## 2021-06-07 PROCEDURE — 73630 X-RAY EXAM OF FOOT: CPT | Mod: 26,LT,, | Performed by: RADIOLOGY

## 2021-06-07 PROCEDURE — 3008F BODY MASS INDEX DOCD: CPT | Mod: CPTII,S$GLB,, | Performed by: PODIATRIST

## 2021-06-07 PROCEDURE — 3008F PR BODY MASS INDEX (BMI) DOCUMENTED: ICD-10-PCS | Mod: CPTII,S$GLB,, | Performed by: PODIATRIST

## 2021-06-07 PROCEDURE — 99999 PR PBB SHADOW E&M-EST. PATIENT-LVL III: ICD-10-PCS | Mod: PBBFAC,,, | Performed by: PODIATRIST

## 2021-06-07 PROCEDURE — 73630 X-RAY EXAM OF FOOT: CPT | Mod: TC,LT

## 2021-09-24 DIAGNOSIS — M25.561 PAIN IN BOTH KNEES, UNSPECIFIED CHRONICITY: Primary | ICD-10-CM

## 2021-09-24 DIAGNOSIS — M25.562 PAIN IN BOTH KNEES, UNSPECIFIED CHRONICITY: Primary | ICD-10-CM

## 2021-09-28 ENCOUNTER — OFFICE VISIT (OUTPATIENT)
Dept: SPORTS MEDICINE | Facility: CLINIC | Age: 31
End: 2021-09-28
Payer: COMMERCIAL

## 2021-09-28 ENCOUNTER — HOSPITAL ENCOUNTER (OUTPATIENT)
Dept: RADIOLOGY | Facility: HOSPITAL | Age: 31
Discharge: HOME | End: 2021-09-28
Attending: STUDENT IN AN ORGANIZED HEALTH CARE EDUCATION/TRAINING PROGRAM
Payer: COMMERCIAL

## 2021-09-28 VITALS — WEIGHT: 159.63 LBS | HEIGHT: 70 IN | BODY MASS INDEX: 22.85 KG/M2 | RESPIRATION RATE: 20 BRPM

## 2021-09-28 DIAGNOSIS — M25.561 PAIN IN BOTH KNEES, UNSPECIFIED CHRONICITY: ICD-10-CM

## 2021-09-28 DIAGNOSIS — M25.562 ACUTE PAIN OF LEFT KNEE: ICD-10-CM

## 2021-09-28 DIAGNOSIS — M25.562 PAIN IN BOTH KNEES, UNSPECIFIED CHRONICITY: ICD-10-CM

## 2021-09-28 DIAGNOSIS — M76.892 HAMSTRING TENDINITIS OF LEFT THIGH: Primary | ICD-10-CM

## 2021-09-28 PROCEDURE — 99999 PR PBB SHADOW E&M-EST. PATIENT-LVL III: CPT | Mod: PBBFAC,,, | Performed by: STUDENT IN AN ORGANIZED HEALTH CARE EDUCATION/TRAINING PROGRAM

## 2021-09-28 PROCEDURE — 73564 XR KNEE ORTHO BILAT WITH FLEXION: ICD-10-PCS | Mod: 26,,, | Performed by: RADIOLOGY

## 2021-09-28 PROCEDURE — 99213 OFFICE O/P EST LOW 20 MIN: CPT | Mod: 25,S$GLB,, | Performed by: STUDENT IN AN ORGANIZED HEALTH CARE EDUCATION/TRAINING PROGRAM

## 2021-09-28 PROCEDURE — 73564 X-RAY EXAM KNEE 4 OR MORE: CPT | Mod: TC,50

## 2021-09-28 PROCEDURE — 3008F PR BODY MASS INDEX (BMI) DOCUMENTED: ICD-10-PCS | Mod: CPTII,S$GLB,, | Performed by: STUDENT IN AN ORGANIZED HEALTH CARE EDUCATION/TRAINING PROGRAM

## 2021-09-28 PROCEDURE — 76882 US LMTD JT/FCL EVL NVASC XTR: CPT | Mod: 26,S$GLB,, | Performed by: STUDENT IN AN ORGANIZED HEALTH CARE EDUCATION/TRAINING PROGRAM

## 2021-09-28 PROCEDURE — 99999 PR PBB SHADOW E&M-EST. PATIENT-LVL III: ICD-10-PCS | Mod: PBBFAC,,, | Performed by: STUDENT IN AN ORGANIZED HEALTH CARE EDUCATION/TRAINING PROGRAM

## 2021-09-28 PROCEDURE — 73564 X-RAY EXAM KNEE 4 OR MORE: CPT | Mod: 26,,, | Performed by: RADIOLOGY

## 2021-09-28 PROCEDURE — 99213 PR OFFICE/OUTPT VISIT, EST, LEVL III, 20-29 MIN: ICD-10-PCS | Mod: 25,S$GLB,, | Performed by: STUDENT IN AN ORGANIZED HEALTH CARE EDUCATION/TRAINING PROGRAM

## 2021-09-28 PROCEDURE — 76882 PR  US,EXTREMITY,NONVASCULAR,REAL-TIME IMAGE,LIMITED: ICD-10-PCS | Mod: 26,S$GLB,, | Performed by: STUDENT IN AN ORGANIZED HEALTH CARE EDUCATION/TRAINING PROGRAM

## 2021-09-28 PROCEDURE — 3008F BODY MASS INDEX DOCD: CPT | Mod: CPTII,S$GLB,, | Performed by: STUDENT IN AN ORGANIZED HEALTH CARE EDUCATION/TRAINING PROGRAM

## 2021-09-30 DIAGNOSIS — M25.551 RIGHT HIP PAIN: Primary | ICD-10-CM

## 2021-10-01 ENCOUNTER — HOSPITAL ENCOUNTER (OUTPATIENT)
Dept: RADIOLOGY | Facility: HOSPITAL | Age: 31
Discharge: HOME | End: 2021-10-01
Attending: ORTHOPAEDIC SURGERY
Payer: COMMERCIAL

## 2021-10-01 ENCOUNTER — OFFICE VISIT (OUTPATIENT)
Dept: ORTHOPEDICS | Facility: CLINIC | Age: 31
End: 2021-10-01
Payer: COMMERCIAL

## 2021-10-01 VITALS — HEIGHT: 70 IN | WEIGHT: 159.63 LBS | BODY MASS INDEX: 22.85 KG/M2

## 2021-10-01 DIAGNOSIS — M25.851 FEMOROACETABULAR IMPINGEMENT OF RIGHT HIP: Primary | ICD-10-CM

## 2021-10-01 DIAGNOSIS — M25.551 RIGHT HIP PAIN: ICD-10-CM

## 2021-10-01 PROCEDURE — 73502 XR HIP WITH PELVIS WHEN PERFORMED, 2 OR 3  VIEWS RIGHT: ICD-10-PCS | Mod: 26,RT,, | Performed by: RADIOLOGY

## 2021-10-01 PROCEDURE — 99999 PR PBB SHADOW E&M-EST. PATIENT-LVL III: ICD-10-PCS | Mod: PBBFAC,,, | Performed by: ORTHOPAEDIC SURGERY

## 2021-10-01 PROCEDURE — 3008F PR BODY MASS INDEX (BMI) DOCUMENTED: ICD-10-PCS | Mod: CPTII,S$GLB,, | Performed by: ORTHOPAEDIC SURGERY

## 2021-10-01 PROCEDURE — 1159F MED LIST DOCD IN RCRD: CPT | Mod: CPTII,S$GLB,, | Performed by: ORTHOPAEDIC SURGERY

## 2021-10-01 PROCEDURE — 73502 X-RAY EXAM HIP UNI 2-3 VIEWS: CPT | Mod: 26,RT,, | Performed by: RADIOLOGY

## 2021-10-01 PROCEDURE — 99999 PR PBB SHADOW E&M-EST. PATIENT-LVL III: CPT | Mod: PBBFAC,,, | Performed by: ORTHOPAEDIC SURGERY

## 2021-10-01 PROCEDURE — 73502 X-RAY EXAM HIP UNI 2-3 VIEWS: CPT | Mod: TC,RT

## 2021-10-01 PROCEDURE — 1159F PR MEDICATION LIST DOCUMENTED IN MEDICAL RECORD: ICD-10-PCS | Mod: CPTII,S$GLB,, | Performed by: ORTHOPAEDIC SURGERY

## 2021-10-01 PROCEDURE — 3008F BODY MASS INDEX DOCD: CPT | Mod: CPTII,S$GLB,, | Performed by: ORTHOPAEDIC SURGERY

## 2021-10-01 PROCEDURE — 99214 OFFICE O/P EST MOD 30 MIN: CPT | Mod: S$GLB,,, | Performed by: ORTHOPAEDIC SURGERY

## 2021-10-01 PROCEDURE — 99214 PR OFFICE/OUTPT VISIT, EST, LEVL IV, 30-39 MIN: ICD-10-PCS | Mod: S$GLB,,, | Performed by: ORTHOPAEDIC SURGERY

## 2021-10-05 ENCOUNTER — TELEPHONE (OUTPATIENT)
Dept: ORTHOPEDICS | Facility: CLINIC | Age: 31
End: 2021-10-05

## 2021-10-28 ENCOUNTER — HOSPITAL ENCOUNTER (OUTPATIENT)
Dept: RADIOLOGY | Facility: HOSPITAL | Age: 31
Discharge: HOME | End: 2021-10-28
Attending: ORTHOPAEDIC SURGERY
Payer: COMMERCIAL

## 2021-10-28 DIAGNOSIS — M25.851 FEMOROACETABULAR IMPINGEMENT OF RIGHT HIP: ICD-10-CM

## 2021-10-28 PROCEDURE — 73722 MRI JOINT OF LWR EXTR W/DYE: CPT | Mod: TC,RT

## 2021-10-28 PROCEDURE — 25500020 PHARM REV CODE 255: Performed by: ORTHOPAEDIC SURGERY

## 2021-10-28 PROCEDURE — 27093 XR ARTHROGRAM HIP RIGHT WITH MRI TO FOLLOW: ICD-10-PCS | Mod: ,,, | Performed by: RADIOLOGY

## 2021-10-28 PROCEDURE — 73525 CONTRAST X-RAY OF HIP: CPT | Mod: TC,RT

## 2021-10-28 PROCEDURE — 73722 MRI ARTHROGRAM HIP RIGHT: ICD-10-PCS | Mod: 26,RT,, | Performed by: RADIOLOGY

## 2021-10-28 PROCEDURE — A9585 GADOBUTROL INJECTION: HCPCS | Performed by: ORTHOPAEDIC SURGERY

## 2021-10-28 PROCEDURE — 27093 INJECTION FOR HIP X-RAY: CPT

## 2021-10-28 PROCEDURE — 73525 CONTRAST X-RAY OF HIP: CPT | Mod: 26,RT,, | Performed by: RADIOLOGY

## 2021-10-28 PROCEDURE — 73722 MRI JOINT OF LWR EXTR W/DYE: CPT | Mod: 26,RT,, | Performed by: RADIOLOGY

## 2021-10-28 PROCEDURE — 73525 XR ARTHROGRAM HIP RIGHT WITH MRI TO FOLLOW: ICD-10-PCS | Mod: 26,RT,, | Performed by: RADIOLOGY

## 2021-10-28 PROCEDURE — 27093 INJECTION FOR HIP X-RAY: CPT | Mod: ,,, | Performed by: RADIOLOGY

## 2021-10-28 RX ORDER — GADOBUTROL 604.72 MG/ML
0.1 INJECTION INTRAVENOUS
Status: COMPLETED | OUTPATIENT
Start: 2021-10-28 | End: 2021-10-28

## 2021-10-28 RX ADMIN — IOHEXOL 10 ML: 350 INJECTION, SOLUTION INTRAVENOUS at 12:10

## 2021-10-28 RX ADMIN — GADOBUTROL 0.1 ML: 604.72 INJECTION INTRAVENOUS at 12:10

## 2021-11-01 ENCOUNTER — OFFICE VISIT (OUTPATIENT)
Dept: ORTHOPEDICS | Facility: CLINIC | Age: 31
End: 2021-11-01
Payer: COMMERCIAL

## 2021-11-01 VITALS — HEIGHT: 70 IN | BODY MASS INDEX: 22.76 KG/M2 | WEIGHT: 159 LBS

## 2021-11-01 DIAGNOSIS — M25.851 FEMOROACETABULAR IMPINGEMENT OF RIGHT HIP: Primary | ICD-10-CM

## 2021-11-01 PROCEDURE — 99999 PR PBB SHADOW E&M-EST. PATIENT-LVL III: ICD-10-PCS | Mod: PBBFAC,,, | Performed by: ORTHOPAEDIC SURGERY

## 2021-11-01 PROCEDURE — 99214 OFFICE O/P EST MOD 30 MIN: CPT | Mod: S$GLB,,, | Performed by: ORTHOPAEDIC SURGERY

## 2021-11-01 PROCEDURE — 99999 PR PBB SHADOW E&M-EST. PATIENT-LVL III: CPT | Mod: PBBFAC,,, | Performed by: ORTHOPAEDIC SURGERY

## 2021-11-01 PROCEDURE — 99214 PR OFFICE/OUTPT VISIT, EST, LEVL IV, 30-39 MIN: ICD-10-PCS | Mod: S$GLB,,, | Performed by: ORTHOPAEDIC SURGERY

## 2021-11-01 RX ORDER — MELOXICAM 7.5 MG/1
7.5 TABLET ORAL DAILY
Qty: 30 TABLET | Refills: 2 | Status: SHIPPED | OUTPATIENT
Start: 2021-11-01 | End: 2022-09-20 | Stop reason: ALTCHOICE

## 2021-12-13 ENCOUNTER — IMMUNIZATION (OUTPATIENT)
Dept: PHARMACY | Facility: CLINIC | Age: 31
End: 2021-12-13
Payer: COMMERCIAL

## 2021-12-13 DIAGNOSIS — Z23 NEED FOR VACCINATION: Primary | ICD-10-CM

## 2022-01-03 DIAGNOSIS — M25.572 LEFT ANKLE PAIN, UNSPECIFIED CHRONICITY: Primary | ICD-10-CM

## 2022-01-06 ENCOUNTER — HOSPITAL ENCOUNTER (OUTPATIENT)
Dept: RADIOLOGY | Facility: HOSPITAL | Age: 32
Discharge: HOME | End: 2022-01-06
Attending: STUDENT IN AN ORGANIZED HEALTH CARE EDUCATION/TRAINING PROGRAM
Payer: COMMERCIAL

## 2022-01-06 ENCOUNTER — OFFICE VISIT (OUTPATIENT)
Dept: PSYCHIATRY | Facility: CLINIC | Age: 32
End: 2022-01-06
Payer: COMMERCIAL

## 2022-01-06 ENCOUNTER — OFFICE VISIT (OUTPATIENT)
Dept: SPORTS MEDICINE | Facility: CLINIC | Age: 32
End: 2022-01-06
Payer: COMMERCIAL

## 2022-01-06 VITALS — RESPIRATION RATE: 20 BRPM | WEIGHT: 161.19 LBS | BODY MASS INDEX: 23.07 KG/M2 | HEIGHT: 70 IN

## 2022-01-06 DIAGNOSIS — M25.572 LEFT ANKLE PAIN, UNSPECIFIED CHRONICITY: ICD-10-CM

## 2022-01-06 DIAGNOSIS — G89.29 CHRONIC PAIN OF LEFT ANKLE: ICD-10-CM

## 2022-01-06 DIAGNOSIS — M76.892 HAMSTRING TENDINITIS OF LEFT THIGH: Primary | ICD-10-CM

## 2022-01-06 DIAGNOSIS — M76.62 ACHILLES TENDINITIS, LEFT LEG: ICD-10-CM

## 2022-01-06 DIAGNOSIS — M25.572 CHRONIC PAIN OF LEFT ANKLE: ICD-10-CM

## 2022-01-06 DIAGNOSIS — F90.0 ADHD (ATTENTION DEFICIT HYPERACTIVITY DISORDER), INATTENTIVE TYPE: ICD-10-CM

## 2022-01-06 DIAGNOSIS — F32.0 CURRENT MILD EPISODE OF MAJOR DEPRESSIVE DISORDER WITHOUT PRIOR EPISODE: Primary | ICD-10-CM

## 2022-01-06 PROCEDURE — 96127 BRIEF EMOTIONAL/BEHAV ASSMT: CPT | Mod: 95,,, | Performed by: NURSE PRACTITIONER

## 2022-01-06 PROCEDURE — 73610 X-RAY EXAM OF ANKLE: CPT | Mod: 26,LT,, | Performed by: RADIOLOGY

## 2022-01-06 PROCEDURE — 99215 OFFICE O/P EST HI 40 MIN: CPT | Mod: 95,,, | Performed by: NURSE PRACTITIONER

## 2022-01-06 PROCEDURE — 3008F PR BODY MASS INDEX (BMI) DOCUMENTED: ICD-10-PCS | Mod: CPTII,S$GLB,, | Performed by: STUDENT IN AN ORGANIZED HEALTH CARE EDUCATION/TRAINING PROGRAM

## 2022-01-06 PROCEDURE — 1159F MED LIST DOCD IN RCRD: CPT | Mod: CPTII,S$GLB,, | Performed by: STUDENT IN AN ORGANIZED HEALTH CARE EDUCATION/TRAINING PROGRAM

## 2022-01-06 PROCEDURE — 73610 X-RAY EXAM OF ANKLE: CPT | Mod: TC,LT

## 2022-01-06 PROCEDURE — 99999 PR PBB SHADOW E&M-EST. PATIENT-LVL III: CPT | Mod: PBBFAC,,, | Performed by: STUDENT IN AN ORGANIZED HEALTH CARE EDUCATION/TRAINING PROGRAM

## 2022-01-06 PROCEDURE — 96127 PR BRIEF EMOTIONAL/BEHAV ASSMT: ICD-10-PCS | Mod: 95,,, | Performed by: NURSE PRACTITIONER

## 2022-01-06 PROCEDURE — 99215 PR OFFICE/OUTPT VISIT, EST, LEVL V, 40-54 MIN: ICD-10-PCS | Mod: 95,,, | Performed by: NURSE PRACTITIONER

## 2022-01-06 PROCEDURE — 99999 PR PBB SHADOW E&M-EST. PATIENT-LVL III: ICD-10-PCS | Mod: PBBFAC,,, | Performed by: STUDENT IN AN ORGANIZED HEALTH CARE EDUCATION/TRAINING PROGRAM

## 2022-01-06 PROCEDURE — 99215 OFFICE O/P EST HI 40 MIN: CPT | Mod: S$GLB,,, | Performed by: STUDENT IN AN ORGANIZED HEALTH CARE EDUCATION/TRAINING PROGRAM

## 2022-01-06 PROCEDURE — 3008F BODY MASS INDEX DOCD: CPT | Mod: CPTII,S$GLB,, | Performed by: STUDENT IN AN ORGANIZED HEALTH CARE EDUCATION/TRAINING PROGRAM

## 2022-01-06 PROCEDURE — 73610 XR ANKLE COMPLETE 3 VIEW LEFT: ICD-10-PCS | Mod: 26,LT,, | Performed by: RADIOLOGY

## 2022-01-06 PROCEDURE — 1159F PR MEDICATION LIST DOCUMENTED IN MEDICAL RECORD: ICD-10-PCS | Mod: CPTII,S$GLB,, | Performed by: STUDENT IN AN ORGANIZED HEALTH CARE EDUCATION/TRAINING PROGRAM

## 2022-01-06 PROCEDURE — 99215 PR OFFICE/OUTPT VISIT, EST, LEVL V, 40-54 MIN: ICD-10-PCS | Mod: S$GLB,,, | Performed by: STUDENT IN AN ORGANIZED HEALTH CARE EDUCATION/TRAINING PROGRAM

## 2022-01-06 RX ORDER — DULOXETIN HYDROCHLORIDE 30 MG/1
30 CAPSULE, DELAYED RELEASE ORAL DAILY
Qty: 30 CAPSULE | Refills: 2 | Status: SHIPPED | OUTPATIENT
Start: 2022-01-06 | End: 2022-04-06

## 2022-01-06 RX ORDER — LISDEXAMFETAMINE DIMESYLATE CAPSULES 10 MG/1
10 CAPSULE ORAL DAILY
Qty: 30 CAPSULE | Refills: 0 | Status: SHIPPED | OUTPATIENT
Start: 2022-01-06 | End: 2022-09-20 | Stop reason: SDUPTHER

## 2022-01-06 NOTE — PATIENT INSTRUCTIONS
Assessment:  Clinton Combs is a 31 y.o. male   Chief Complaint   Patient presents with    Left Ankle - Pain       Encounter Diagnoses   Name Primary?    Hamstring tendinitis of left thigh Yes    Achilles tendinitis, left leg     Chronic pain of left ankle         Plan:  Inserts are powersteps or superfeet.      Achilles Tendinopathy:    -pain in the back of ankle where your calf muscles attach to the heel bone    -usually worse with first step in morning then improves with walking    -prolonged activity or standing worsens pain    -not usually associated with numbeness or swelling (if present let doctor know)    -may cause sharp pain up leg    TREATMENT:    -If limping, you may need to be immobilized in walking boot to allow the tendon to calm down until you can walk without pain    -wear boot at night or use night splint (may be purchased at drug store) to keep ankle in flexed position to allow tendon to heal in more neutral position    -Relative rest -Once pain free at rest and with walking, you may increase your activity level as tolerated.    -Cross-train with pool running, biking, elliptical    -use gel heel cups on both sides to temporarily relieve tension on achilles by raising the heel but need to wean out of them once pain improves to avoid chronic shortening/tightening of tendon    -topical anti-inflammatory/capsascian cream to painful/swollen areas (can buy in any drug store)    Daily:    -warm up ankles and foot before get up from sleeping or prolonged sitting, perform circular range of motion exercises of ankle    -strengthening exercises - use theraband or nothing and point and flex ankle 20 times (see below)    -stretch (avoid stretching to point of pulling sensation, only comfortable gentle stretch)    -Stretching and strengthening therapy    -Ice 15-20 minutes after activity. (Ice bath for 5-7min)    -Ice and NSAIDs help decrease inflammation. A good anti-inflammatory NSAID medication is  Alleve (220mg). Take 2 tabs twice daily with food until pain improves or minimum of 14 days, then as needed for pain.    -often component of hip weakness that leads to lower extremity (foot, ankle, shin, and knee) problems so a lot of focus will be on core strength and balance    - recommend yoga for core strengthening and stretching    -Perform exercises as instructed through handout or formal therapy if doing. Until then start with the following:    -ankle strengthening-    1) balance on one foot 1-2 min daily    2) once able to balance for 1 minute, start hip strengthening with 4 way motion with straight leg. tie theraband around ankle and balance on other foot while doing 15 reps each direction of straight leg motion    3) arch raises- tighten bottom of foot and hold x 3-5 sec, repeat 5 times    4) ankle exercises (4 way with theraband)- 3 sets of 10-15 (fatigue) daily    5) heel raises on step-- slowly lower on one foot (start on flat ground until pain free, then advance to step)    6) heel cord stretching-- hold stretch for 20 sec x 5 at least twice daily (with leg straight and knee bent)    -usually takes several weeks before pain free but longer before return to full activity without pain    -Follow-up in 6 weeks if not improved or sooner if further concern.    If problem flares again after resolved, restart icing, and exercises.    Other info:    If using a walking boot:    The Even Up Shoe  is the answer to hip, back, and knee pain caused by uneven gait dues to walking with an orthotic walking boot or wound care shoe.    These may be purchased online with out a prescription. Usually the cost is about $29-35.    If a runner:    -evaluate your gait to avoid over striding which may cause landing on heel    -count debra (count how many times right foot steps in a minute and multiply by 2)    -goal 170-180 debra max, increase slowly with the help of a metronome homa on smart phone    -focus on kick  back rather reaching forward to increase speed    -usually takes several weeks before pain free but longer before return to full activity without pain    --Once released to increase activity, BE PATIENT!    Return to activity guidelines include:    -if it hurts, don't do it    -start gradually and build up, wait 24 hours before increase intensity and time     -Runnin min on treadmill (or somewhere you can get home easily from if you have to stop), start walk 4 min/run 1 min and Repeat 3 times. If pain free for 48 hours, increase to walk 3 min/run 2 min. Continue to increase as such as pain allows. If you develop pain, back off to previous pain-free level and wait 1 week before increasing again.      Follow-up: 1-2 months or sooner if there are any problems between now and then.    Thank you for choosing [facility] and Dr. Bashir Guerrero for your orthopedic & sports medicine care. It is our goal to provide you with exceptional care that will help keep you healthy, active, and get you back in the game.    Please do not hesitate to reach out to us via email, phone, or MyChart with any questions, concerns, or feedback.    If you felt that you received exemplary care today, please consider leaving us feedback on Healthgrades at:  [email or web address]    If you are experiencing pain/discomfort ,or have questions after 5pm and would like to be connected to the [facility] on-call team, please call this number and specify which Sports Medicine provider is treating you: [phone]

## 2022-01-06 NOTE — PROGRESS NOTES
Outpatient Psychiatry Follow-Up Visit (MD/NP)      Disclaimer: Evaluation and treatment is based on information presented to date. Any new information may affect assessment and findings.     The patient location is: car  The chief complaint leading to consultation is: car    Visit type: audiovisual      44 minutes of total time spent on the encounter, which includes face to face time and non-face to face time preparing to see the patient (eg, review of tests), Obtaining and/or reviewing separately obtained history, Documenting clinical information in the electronic or other health record, Independently interpreting results (not separately reported) and communicating results to the patient/family/caregiver, or Care coordination (not separately reported).         Each patient to whom he or she provides medical services by telemedicine is:  (1) informed of the relationship between the physician and patient and the respective role of any other health care provider with respect to management of the patient; and (2) notified that he or she may decline to receive medical services by telemedicine and may withdraw from such care at any time.        Crisis Disclaimer: Patient was informed that due to the virtual nature of the visit, that if a crisis develops, protocols will be implemented to ensure patient safety, including but not limited to: 1) Initiating a welfare check with local Law Enforcement, 2) Calling 911/National Crisis Hotline, and/or 3) Initiating PEC/CEC procedures.         Clinical Status of Patient:  Outpatient (Ambulatory)    Chief Complaint:  Clinton Combs is a 31 y.o. male who presents today for follow-up of anxiety and attention problems.  Met with patient.      Interval History and Content of Current Session:  Interim Events/Subjective Report/Content of Current Session: completed/reviewed questionnaires with patient. ALVAREZ, PHQ    PHQ 9 = 8  ALVAREZ 7 = 9    ADHD screen:  Part A: 16  Part B:  31    Pt last  "seen in March last year.  " I have been taking vyvanse sparingly"  " I don't take it every day" " it's too expensive" directed pt to vyvanse coupon website    " it works when I take it"  " I don't like taking it every day  Has not taken for couple of weeks.    Denies side effects from medication    Reviewed recent lab work    Feels dosage works well    Verbalizes some depressive sx.  Several days of feeling down; anhedonia, lack of energy.     verbalizes  anxiety Has been taking atarax for this. Describes panic attacks      Discussed therapy- " my issues are related to physical pain"  " issues with leg and it's not improving" not wanting to do therapy at this time    Discussed use of cymbalta for depression, anxiety and pain    Denies suicidal or homicidal ideations; denies self-injury, cutting or burning                          Depressive Disorder: REPORTS depressed mood, tired/fatigued, concentration problems.   Anxiety Disorder: panic attacks, hyperarousal symptoms, muscle tension, concentration problems, excessive worry.   Manic Disorder: DENIES none.   Psychotic Disorder: DENIES none.   Substance Use:  DENIES none.   Physical or Sexual Abuse: denies history of physical or sexual abuse       Review of Systems   · PSYCHIATRIC: Pertinant items are noted in the narrative.    Past Medical, Family and Social History: The patient's past medical, family and social history have been reviewed and updated as appropriate within the electronic medical record - see encounter notes.    Compliance: yes    Side effects: None    Risk Parameters:  Patient reports no suicidal ideation  Patient reports no homicidal ideation  Patient reports no self-injurious behavior  Patient reports no violent behavior    Exam (detailed: at least 9 elements; comprehensive: all 15 elements)   Constitutional  Vitals:  Most recent vital signs, dated less than 90 days prior to this appointment, were reviewed.   Last 3 sets of Vitals    Vitals - 1 " "value per visit 9/28/2021 10/1/2021 11/1/2021   SYSTOLIC - - -   DIASTOLIC - - -   PULSE - - -   TEMPERATURE - - -   RESPIRATIONS 20 - -   Weight (lb) 159.61 159.61 159   Weight (kg) 72.4 72.4 72.122   HEIGHT 5' 10" 5' 10" 5' 10"   BODY MASS INDEX 22.9 22.9 22.81   VISIT REPORT - - -   Pain Score  2 4 4          General:  unremarkable, age appropriate, casually dressed     Musculoskeletal  Muscle Strength/Tone:  not examined   Gait & Station:  non-ataxic     Psychiatric  Speech:  no latency; no press   Mood & Affect:  dysthymic  congruent and appropriate, blunted   Thought Process:  normal and logical, abstract, goal-directed   Associations:  intact   Thought Content:  normal, no suicidality, no homicidality, delusions, or paranoia   Insight:  intact, has awareness of illness   Judgement: behavior is adequate to circumstances   Orientation:  grossly intact   Memory: intact for content of interview   Language: grossly intact   Attention Span & Concentration:  able to focus   Fund of Knowledge:  intact and appropriate to age and level of education, familiar with aspects of current personal life     Assessment and Diagnosis   Status/Progress: Based on the examination today, the patient's problem(s) is/are worsening.  New problems have been presented today.   Co-morbidities are complicating management of the primary condition.  There are no active rule-out diagnoses for this patient at this time.     General Impression:     Encounter Diagnoses   Name Primary?    ADHD (attention deficit hyperactivity disorder), inattentive type     Current mild episode of major depressive disorder without prior episode Yes       Intervention/Counseling/Treatment Plan   · Medication Management: The risks and benefits of medication were discussed with the patient.  · Refill vyvanse 10 mg  · Add cymbalta 30 mg daily for depression, pain anxiety  · rtc 2 weeks    Reviewed pathophysiology of ADHD, depression and anxiety. Discussed rationale " behind treatment. Reviewed treatment options, including medication and psychotherapy. Reviewed pharmacology including onset of action, dosing, side effects, adverse reactions and duration of therapy (6-12 months).  Discussed the need to stay on medication if it is effective and not discontinue after a few weeks due to the probability of relapse.    Pt expressed appreciation for the visit today and did not have further question at this time though pt  was still informed to:     Call / Walk In if problems.    Call Report Side Effects to Psyc MD     Encouraged to follow up with primary care / Gen Med MD for continued monitoring of general health and wellness.    Call 911  Or go to ER if Acute Concerns (especially if any thoughts of harm to self or other).     Understanding was expressed; and no further concerns nor questions were raised at this time.        Return to Clinic: 2 weeks

## 2022-01-06 NOTE — PATIENT INSTRUCTIONS
"If you have been experiencing some of the following signs and symptoms most of the day, nearly every day, for at least two weeks, you may be suffering from depression:    Persistent sad, anxious, or empty mood  Feelings of hopelessness, or pessimism  Irritability  Feelings of guilt, worthlessness, or helplessness  Loss of interest or pleasure in hobbies and activities  Decreased energy or fatigue  Moving or talking more slowly  Feeling restless or having trouble sitting still  Difficulty concentrating, remembering, or making decisions  Difficulty sleeping, early-morning awakening, or oversleeping  Appetite and/or weight changes  Thoughts of death or suicide, or suicide attempts  Aches or pains, headaches, cramps, or digestive problems without a clear physical cause and/or that do not ease even with treatment  Not everyone who is depressed experiences every symptom. Some people experience only a few symptoms while others may experience many. Several persistent symptoms in addition to low mood are required for a diagnosis of major depression, but people with only a few - but distressing - symptoms may benefit from treatment of their subsyndromal depression. The severity and frequency of symptoms and how long they last will vary depending on the individual and his or her particular illness. Symptoms may also vary depending on the stage of the illness.      Treatment and Therapies  Depression, even the most severe cases, can be treated. The earlier that treatment can begin, the more effective it is. Depression is usually treated with medications, psychotherapy, or a combination of the two. If these treatments do not reduce symptoms,  and other brain stimulation therapies may be options to explore.      Quick Tip: No two people are affected the same way by depression and there is no "one-size-fits-all" for treatment. It may take some trial and error to find the treatment that works best for " you.      Depression  Overview  Depression (major depressive disorder or clinical depression) is a common but serious mood disorder. It causes severe symptoms that affect how you feel, think, and handle daily activities, such as sleeping, eating, or working. To be diagnosed with depression, the symptoms must be present for at least two weeks.    Some forms of depression are slightly different, or they may develop under unique circumstances, such as:    Persistent depressive disorder (also called dysthymia) is a depressed mood that lasts for at least two years. A person diagnosed with persistent depressive disorder may have episodes of major depression along with periods of less severe symptoms, but symptoms must last for two years to be considered persistent depressive disorder.  Postpartum depression is much more serious than the baby blues (relatively mild depressive and anxiety symptoms that typically clear within two weeks after delivery) that many women experience after giving birth. Women with postpartum depression experience full-blown major depression during pregnancy or after delivery (postpartum depression). The feelings of extreme sadness, anxiety, and exhaustion that accompany postpartum depression may make it difficult for these new mothers to complete daily care activities for themselves and/or for their babies.  Psychotic depression occurs when a person has severe depression plus some form of psychosis, such as having disturbing false fixed beliefs (delusions) or hearing or seeing upsetting things that others cannot hear or see (hallucinations). The psychotic symptoms typically have a depressive theme, such as delusions of guilt, poverty, or illness.  Seasonal affective disorder is characterized by the onset of depression during the winter months, when there is less natural sunlight. This depression generally lifts during spring and summer. Winter depression, typically accompanied by social  withdrawal, increased sleep, and weight gain, predictably returns every year in seasonal affective disorder.  Bipolar disorder is different from depression, but it is included in this list is because someone with bipolar disorder experiences episodes of extremely low moods that meet the criteria for major depression (called bipolar depression). But a person with bipolar disorder also experiences extreme high - euphoric or irritable - moods called gabriel or a less severe form called hypomania.  Examples of other types of depressive disorders newly added to the diagnostic classification of DSM-5 include disruptive mood dysregulation disorder (diagnosed in children and adolescents) and premenstrual dysphoric disorder (PMDD).    Signs and Symptoms  The Centers for Disease Control and Prevention (CDC) has recognized that having certain mental disorders, including depression and schizophrenia, can make people more likely to get severely ill from COVID-19. Learn more about getting help and finding a health care provider on [facility]'s Help for Mental Illnesses webpage.  If you have been experiencing some of the following signs and symptoms most of the day, nearly every day, for at least two weeks, you may be suffering from depression:    Persistent sad, anxious, or empty mood  Feelings of hopelessness, or pessimism  Irritability  Feelings of guilt, worthlessness, or helplessness  Loss of interest or pleasure in hobbies and activities  Decreased energy or fatigue  Moving or talking more slowly  Feeling restless or having trouble sitting still  Difficulty concentrating, remembering, or making decisions  Difficulty sleeping, early-morning awakening, or oversleeping  Appetite and/or weight changes  Thoughts of death or suicide, or suicide attempts  Aches or pains, headaches, cramps, or digestive problems without a clear physical cause and/or that do not ease even with treatment  Not everyone who is depressed experiences  every symptom. Some people experience only a few symptoms while others may experience many. Several persistent symptoms in addition to low mood are required for a diagnosis of major depression, but people with only a few - but distressing - symptoms may benefit from treatment of their subsyndromal depression. The severity and frequency of symptoms and how long they last will vary depending on the individual and his or her particular illness. Symptoms may also vary depending on the stage of the illness.    Risk Factors  Depression is one of the most common mental disorders in the [place] Current research suggests that depression is caused by a combination of genetic, biological, environmental, and psychological factors.    Depression can happen at any age, but often begins in adulthood. Depression is now recognized as occurring in children and adolescents, although it sometimes presents with more prominent irritability than low mood. Many chronic mood and anxiety disorders in adults begin as high levels of anxiety in children.    Depression, especially in midlife or older adults, can co-occur with other serious medical illnesses, such as diabetes, cancer, heart disease, and Parkinsons disease. These conditions are often worse when depression is present. Sometimes medications taken for these physical illnesses may cause side effects that contribute to depression. A doctor experienced in treating these complicated illnesses can help work out the best treatment strategy.    Risk factors include:    Personal or family history of depression  Major life changes, trauma, or stress  Certain physical illnesses and medications  Treatment and Therapies  Depression, even the most severe cases, can be treated. The earlier that treatment can begin, the more effective it is. Depression is usually treated with medications, psychotherapy, or a combination of the two. If these treatments do not reduce symptoms, electroconvulsive  "therapy (ECT) and other brain stimulation therapies may be options to explore.    Quick Tip: No two people are affected the same way by depression and there is no "one-size-fits-all" for treatment. It may take some trial and error to find the treatment that works best for you.    Medications  Antidepressants are medicines that treat depression. They may help improve the way your brain uses certain chemicals that control mood or stress. You may need to try several different antidepressant medicines before finding the one that improves your symptoms and has manageable side effects. A medication that has helped you or a close family member in the past will often be considered.    Antidepressants take time - usually 2 to 4 weeks - to work, and often, symptoms such as sleep, appetite, and concentration problems improve before mood lifts, so it is important to give medication a chance before reaching a conclusion about its effectiveness. If you begin taking antidepressants, do not stop taking them without the help of a doctor. Sometimes people taking antidepressants feel better and then stop taking the medication on their own, and the depression returns. When you and your doctor have decided it is time to stop the medication, usually after a course of 6 to 12 months, the doctor will help you slowly and safely decrease your dose. Stopping them abruptly can cause withdrawal symptoms.    Psychotherapies  Several types of psychotherapy (also called talk therapy or, in a less specific form, counseling) can help people with depression. Examples of evidence-based approaches specific to the treatment of depression include cognitive-behavioral therapy (CBT), interpersonal therapy (IPT), and problem-solving therapy.    Beyond Treatment: Things You Can Do  Here are other tips that may help you or a loved one during treatment for depression:    Try to be active and exercise.  Set realistic goals for yourself.  Try to spend time " with other people and confide in a trusted friend or relative.  Try not to isolate yourself, and let others help you.  Expect your mood to improve gradually, not immediately.  Postpone important decisions, such as getting  or , or changing jobs until you feel better. Discuss decisions with others who know you well and have a more objective view of your situation.  Continue to educate yourself about depression.

## 2022-01-06 NOTE — PROGRESS NOTES
Patient ID: Clinton Combs  YOB: 1990  MRN: [id number]    Chief Complaint: Pain of the Left Ankle    Referred By: Self  for Left Ankle Pain     History of Present Illness: Clinton Combs is a right-hand dominant 31 y.o. male who presents today with 4/10 pain c/o Left Ankle Pain .     The patient is active in none.  Occupation:      31-year-old male presenting today for left ankle and left foot pain.  He has been having this pain on and off now for some time.  He has been evaluated by Dr. Denver Albert for right hip pain in the past and has done extensive therapy for his hip with Yogesh philip in for that.  He previously saw me for some insertional biceps femoris tendinosis.  He continues to work on some posterior chain stretching, but notes most his pain now is in the posterior calf Achilles tendon and sometimes over left lateral foot.  Worse with persistent exercise or activity and he feels like he is weak with plantar flexion of his ankle.  He denies any other new injuries traumas.  Notes a remote injury when he was in his early teens after long soccer  had some posterior ankle pain most likely consist and with some apophysitis.  He denies any significant instability to the ankle today with does note a remote history of multiple ankle sprains.  Does not currently in an ankle brace.  He notes sometimes pain can radiate from his ankle down to the pinky toe at times.  He also notes a history of a bulging discs in his lower back L5-S1 that has never been formally evaluated.    Past Medical History:   Past Medical History:   Diagnosis Date    ELE positive      Past Surgical History:   Procedure Laterality Date    back injections       Family History   Problem Relation Age of Onset    Diabetes Father         prediabetes     Alzheimer's disease Maternal Grandmother     Heart attack Maternal Grandfather      Social History     Socioeconomic History    Marital status: Single     Number of children: 0   Occupational History     Comment:     Tobacco Use    Smoking status: Never Smoker    Smokeless tobacco: Never Used   Substance and Sexual Activity    Alcohol use: Yes    Drug use: Never    Sexual activity: Not Currently     Medication List with Changes/Refills   Current Medications    DULOXETINE (CYMBALTA) 30 MG CAPSULE    Take 1 capsule (30 mg total) by mouth once daily.    HYDROXYZINE HCL (ATARAX) 10 MG TAB    Take 1/2 tablet ( 5 mg) to 1 tablet (10 mg) twice a day, as needed for anxiety or panic attacks    LISDEXAMFETAMINE (VYVANSE) 10 MG CAP    Take 10 mg by mouth once daily.    MELOXICAM (MOBIC) 7.5 MG TABLET    Take 1 tablet (7.5 mg total) by mouth once daily.     Review of patient's allergies indicates:  No Known Allergies    Physical Exam:   Body mass index is 23.12 kg/m².    GENERAL: Well appearing, in no acute distress.  HEAD: Normocephalic and atraumatic.  ENT: External ears and nose grossly normal.  EYES: EOMI bilaterally  PULMONARY: Respirations are grossly even and non-labored.  NEURO: Awake, alert, and oriented x 3.  SKIN: No obvious rashes appreciated.  PSYCH: Mood & affect are appropriate.    Detailed MSK exam:     Left ankle exam  No tenderness over the proximal fibular head no calf pain appreciated mild difference in muscle bulk of the left calf compared to the right.  Tightness with calf and gastroc testing as well as extreme tightness with bilateral hamstrings.  Still some tenderness to touch over the distal biceps femoris on the left knee.  Good range of motion of the ankle in plantar flexion dorsiflexion inversion eversion good strength in all ranges of motion mild tenderness over the insertional Achilles negative calcaneal squeeze some tenderness with pinching of Kager's fat pad.  Negative impingement signs today no tenderness over the mediolateral malleolus no tenderness over the anterior talar dome some tenderness over the ATFL no tenderness  over the CFL appreciated.  Negative anterior drawer negative talar tilt no tenderness over the base of the 5th metatarsal.  Neurovascularly intact distally 2+ dorsalis pedis pulse    Imaging:    Narrative & Impression  EXAMINATION:  XR ANKLE COMPLETE 3 VIEW LEFT     CLINICAL HISTORY:  Pain in left ankle and joints of left foot     TECHNIQUE:  AP, lateral and oblique views of the left ankle were performed.     COMPARISON:  None     FINDINGS:  No acute osseous or soft tissue abnormality.     Impression:     As above        Electronically signed by: Sumit Pizano MD  Date:                                            01/06/2022  Time:                                           08:13    Relevant imaging results were reviewed and interpreted by me and per my read as above.  This was discussed with the patient and / or family today.     Assessment:  Clinton Combs is a 31 y.o. male presents today with persistent left foot ankle pain.  He has some signs of Achilles tendinitis today as well as some possible lumbar radicular signs as well.  Discussed the treatment of Achilles tendinopathy posterior chain stretching as well as topical anti-inflammatories.  Discussed ankle bracing potentially as well meantime we discussed at length concentric and eccentric strengthening of the calf and gastroc muscles.  We can follow-up in 1-2 months or sooner if needed.  Is still having issues consider formal physical therapy at that time.    Hamstring tendinitis of left thigh    Achilles tendinitis, left leg    Chronic pain of left ankle       I spent a total of 40 minutes on the day of the visit.  This includes face to face time and non-face to face time preparing to see the patient (eg, review of tests), obtaining and/or reviewing separately obtained history, documenting clinical information in the electronic or other health record, independently interpreting results and communicating results to the patient/family/caregiver, or care  coordinator.      A copy of today's visit note has been sent to the referring provider.       Bashir Guerrero MD    Disclaimer: This note was prepared using a voice recognition system and is likely to have sound alike errors within the text.

## 2022-03-07 ENCOUNTER — OFFICE VISIT (OUTPATIENT)
Dept: SPORTS MEDICINE | Facility: CLINIC | Age: 32
End: 2022-03-07
Payer: COMMERCIAL

## 2022-03-07 VITALS — RESPIRATION RATE: 20 BRPM | HEIGHT: 70 IN | WEIGHT: 159.63 LBS | BODY MASS INDEX: 22.85 KG/M2

## 2022-03-07 DIAGNOSIS — M76.892 HAMSTRING TENDINITIS OF LEFT THIGH: Primary | ICD-10-CM

## 2022-03-07 DIAGNOSIS — S86.892D MEDIAL TIBIAL STRESS SYNDROME, LEFT, SUBSEQUENT ENCOUNTER: ICD-10-CM

## 2022-03-07 PROCEDURE — 99999 PR PBB SHADOW E&M-EST. PATIENT-LVL III: ICD-10-PCS | Mod: PBBFAC,,, | Performed by: STUDENT IN AN ORGANIZED HEALTH CARE EDUCATION/TRAINING PROGRAM

## 2022-03-07 PROCEDURE — 99214 OFFICE O/P EST MOD 30 MIN: CPT | Mod: S$GLB,,, | Performed by: STUDENT IN AN ORGANIZED HEALTH CARE EDUCATION/TRAINING PROGRAM

## 2022-03-07 PROCEDURE — 3008F PR BODY MASS INDEX (BMI) DOCUMENTED: ICD-10-PCS | Mod: CPTII,S$GLB,, | Performed by: STUDENT IN AN ORGANIZED HEALTH CARE EDUCATION/TRAINING PROGRAM

## 2022-03-07 PROCEDURE — 99999 PR PBB SHADOW E&M-EST. PATIENT-LVL III: CPT | Mod: PBBFAC,,, | Performed by: STUDENT IN AN ORGANIZED HEALTH CARE EDUCATION/TRAINING PROGRAM

## 2022-03-07 PROCEDURE — 99214 PR OFFICE/OUTPT VISIT, EST, LEVL IV, 30-39 MIN: ICD-10-PCS | Mod: S$GLB,,, | Performed by: STUDENT IN AN ORGANIZED HEALTH CARE EDUCATION/TRAINING PROGRAM

## 2022-03-07 PROCEDURE — 3008F BODY MASS INDEX DOCD: CPT | Mod: CPTII,S$GLB,, | Performed by: STUDENT IN AN ORGANIZED HEALTH CARE EDUCATION/TRAINING PROGRAM

## 2022-03-07 NOTE — PROGRESS NOTES
Patient ID: Clinton Combs  YOB: 1990  MRN: [id number]    Chief Complaint: Follow-up (Hamstring Tendinitis , Achilles Tendinitis, Left Leg Pain )      History of Present Illness: Clinton Combs is a right-hand dominant 31 y.o. male who presents today with 3/10 pain c/o  Follow-up Hamstring Tendinitis , Achilles Tendinitis, Left Leg Pain  .     The patient is active in none.  Occupation:      31-year-old male presenting today for follow-up for left Achilles tendinitis and left knee pain.  He has been doing some home exercises as well as eccentric activities a few times a week and feels like he has seen some improvement overall.  He has also seen a chiropractor who has been working on his left gastrocnemius with some manual work in therapy and feels like that is improved slightly as well but he is still not able to get back to running secondary to the pain.  He feels like he has some decreased sensation over the lateral aspect of his shin.  But notes most of his pain with activity and palpation is over the medial aspect of the left shin.  He also notes some nondescript global knee pain at the in the day.  Denies any swelling instability.    Past Medical History:   Past Medical History:   Diagnosis Date    ELE positive      Past Surgical History:   Procedure Laterality Date    back injections       Family History   Problem Relation Age of Onset    Diabetes Father         prediabetes     Alzheimer's disease Maternal Grandmother     Heart attack Maternal Grandfather      Social History     Socioeconomic History    Marital status: Single    Number of children: 0   Occupational History     Comment:     Tobacco Use    Smoking status: Never Smoker    Smokeless tobacco: Never Used   Substance and Sexual Activity    Alcohol use: Yes    Drug use: Never    Sexual activity: Not Currently     Medication List with Changes/Refills   Current Medications    DULOXETINE  (CYMBALTA) 30 MG CAPSULE    Take 1 capsule (30 mg total) by mouth once daily.    HYDROXYZINE HCL (ATARAX) 10 MG TAB    Take 1/2 tablet ( 5 mg) to 1 tablet (10 mg) twice a day, as needed for anxiety or panic attacks    LISDEXAMFETAMINE (VYVANSE) 10 MG CAP    Take 10 mg by mouth once daily.    MELOXICAM (MOBIC) 7.5 MG TABLET    Take 1 tablet (7.5 mg total) by mouth once daily.     Review of patient's allergies indicates:  No Known Allergies    Physical Exam:   Body mass index is 22.9 kg/m².    GENERAL: Well appearing, in no acute distress.  HEAD: Normocephalic and atraumatic.  ENT: External ears and nose grossly normal.  EYES: EOMI bilaterally  PULMONARY: Respirations are grossly even and non-labored.  NEURO: Awake, alert, and oriented x 3.  SKIN: No obvious rashes appreciated.  PSYCH: Mood & affect are appropriate.    Detailed MSK exam:     Left shin/lower leg exam  No tenderness over the anterior tibia mild tenderness over the medial tibia and significant tenderness to palpation over the medial gutter extending almost the entire with of the diaphysis.  Minimal to no tenderness over the Achilles today midsubstance or insertional.  Negative squeeze test no effusion left knee appreciated.    Sensation grossly intact throughout lower extremity dermatomes    Imaging:    No new imaging    Assessment:  Clinton Combs is a 31 y.o. male presents today with signs and symptoms most consistent with medial tibial stress syndrome left shin.  We discussed the diagnosis prognosis as well as conservative treatment options moving forward.  He has done home exercise at home without much relief and is seeing a chiropractor discussed formal physical therapy will with 1 of our sports therapists as the mainstay treatment for this.  Follow-up with me as needed and as progressing.    Hamstring tendinitis of left thigh  -     Ambulatory referral/consult to Physical/Occupational Therapy; Future; Expected date: 03/14/2022    Medial tibial  stress syndrome, left, subsequent encounter  -     Ambulatory referral/consult to Physical/Occupational Therapy; Future; Expected date: 03/14/2022       I spent a total of 30 minutes on the day of the visit.  This includes face to face time and non-face to face time preparing to see the patient (eg, review of tests), obtaining and/or reviewing separately obtained history, documenting clinical information in the electronic or other health record, independently interpreting results and communicating results to the patient/family/caregiver, or care coordinator.        Bashir Guerrero MD    Disclaimer: This note was prepared using a voice recognition system and is likely to have sound alike errors within the text.

## 2022-03-07 NOTE — PATIENT INSTRUCTIONS
Assessment:  Clinton Combs is a 31 y.o. male   Chief Complaint   Patient presents with    Follow-up     Hamstring Tendinitis , Achilles Tendinitis, Left Leg Pain        Encounter Diagnoses   Name Primary?    Hamstring tendinitis of left thigh Yes    Medial tibial stress syndrome, left, subsequent encounter         Plan:  SHIN PAIN:    -Relative rest -Once pain free at rest, you may increase your activity level as tolerated.    -Cross-train with pool running, biking, elliptical    -Do not run if limping or if pain becomes focused in a small area of shin. Pain that lasts after running is a worrisome sign for a stress fracture. Please call for a bone scan if this occurs.    -Ice 10-15 minutes after activity. (Ice cups for massage 5-7min)    -Ice and NSAIDs help decrease inflammation. A good anti-inflammatory NSAID medication is Alleve (220mg). Take 2 tabs twice daily with food until pain improves or minimum of 14 days, then as needed for pain.    -often component of hip weakness that leads to shin and knee problems so a lot of focus will be on core strength    - recommend yoga for core strengthening and stretching    -Perform exercises as instructed through therapy. Until then start with the following:    -evaluate your gait to avoid over striding    -ideas in count debra (count how many times right foot steps in a minute and multiply by 2)    -goal 170-180 debra max, increase slowly with the help of a metronome homa on smart phone    -focus on kick back rather reaching forward to increase speed    -ankle strengthening-- home exercises inc    -balance on one foot 1-2 min daily    -once able to balance for 1 minute, start hip strengthening with 4 way motion with straight leg. tie theraband around ankle and balance on other foot while doing 15 reps each direction of straight leg motion    -arch raises- tighten bottom of foot and hold x 3-5 sec, repeat 5 times    -ankle exercises (4 way with theraband)- 3 sets of  10-15 (fatigue) daily    -usually takes several weeks before pain free but longer before return to full activity without pain    --Once released to increase activity, BE PATIENT!    Return to activity guidelines include:    -if it hurts, don't do it    -start gradually and build up, wait 24 hours before increase intensity and time    -Runnin min on treadmill (or somewhere you can get home easily from if you have to stop), start walk 4 min/run 1 min and Repeat 3 times. If pain free for 48 hours, increase to walk 3 min/run 2 min. Continue to increase as such as pain allows. If you develop pain, back off to previous pain-free level and wait 1 week before increasing again.    -Follow-up in 6 weeks if not improved or sooner if further concern.    If problem flares again after resolved, restart icing, and exercises.     Follow-up:  As needed or sooner if there are any problems between now and then.    Thank you for choosing [facility] and Dr. Bashir Guerrero for your orthopedic & sports medicine care. It is our goal to provide you with exceptional care that will help keep you healthy, active, and get you back in the game.    Please do not hesitate to reach out to us via email, phone, or MyChart with any questions, concerns, or feedback.    If you felt that you received exemplary care today, please consider leaving us feedback on Healthgrades at:  [email or web address]    If you are experiencing pain/discomfort ,or have questions after 5pm and would like to be connected to the [facility] on-call team, please call this number and specify which Sports Medicine provider is treating you: [phone]

## 2022-03-10 ENCOUNTER — CLINICAL SUPPORT (OUTPATIENT)
Dept: REHABILITATION | Facility: HOSPITAL | Age: 32
End: 2022-03-10
Attending: STUDENT IN AN ORGANIZED HEALTH CARE EDUCATION/TRAINING PROGRAM
Payer: COMMERCIAL

## 2022-03-10 DIAGNOSIS — S86.892D MEDIAL TIBIAL STRESS SYNDROME, LEFT, SUBSEQUENT ENCOUNTER: ICD-10-CM

## 2022-03-10 DIAGNOSIS — M25.551 BILATERAL HIP PAIN: ICD-10-CM

## 2022-03-10 DIAGNOSIS — M25.552 BILATERAL HIP PAIN: ICD-10-CM

## 2022-03-10 DIAGNOSIS — M76.892 HAMSTRING TENDINITIS OF LEFT THIGH: ICD-10-CM

## 2022-03-10 DIAGNOSIS — M25.562 ACUTE PAIN OF LEFT KNEE: ICD-10-CM

## 2022-03-10 PROCEDURE — 97162 PT EVAL MOD COMPLEX 30 MIN: CPT | Performed by: PHYSICAL THERAPIST

## 2022-03-10 PROCEDURE — 97110 THERAPEUTIC EXERCISES: CPT | Performed by: PHYSICAL THERAPIST

## 2022-03-10 NOTE — PLAN OF CARE
[facility]  Physical Therapy Initial Evaluation    Name: Clinton Combs  Clinic Number: [id number]    Therapy Diagnosis:   Encounter Diagnoses   Name Primary?    Hamstring tendinitis of left thigh     Medial tibial stress syndrome, left, subsequent encounter     Acute pain of left knee     Bilateral hip pain      Physician: Bashir Guerrero MD    Physician Orders: PT Eval and Treat   Medical Diagnosis from Referral:   M76.892 (ICD-10-CM) - Hamstring tendinitis of left thigh   S86.892D (ICD-10-CM) - Medial tibial stress syndrome, left, subsequent encounter     Evaluation Date: 3/10/2022  Authorization Period Expiration: 12/31/2022  Plan of Care Expiration: 5/10/2022  Visit # / Visits authorized: 1/ 1  FOTO: 1/3      Time In: 700  Time Out: 745  Total Billable Time: 45 minutes    Precautions: Standard    Subjective   Date of onset: 09/2021    History of current condition - Clinton reports: playing beach volleyball with friends back in September when left leg just started hurting afterward. Unknown reason. Pt reports no swelling in knee but symptoms have stayed the same for the most part. Trying to perform stretches and strengthening but no significant improvements with it. Does work out during week along with attempting to run for 1 mile at a time prior to symptoms returning. Clicking and popping in knee noted as well as both hips. No medicine at this time. Pain does not increase during running. Right hip slightly worse pain wise than left as well. Will get left shin pain when sitting at work and has to shift over to right side to help with it.        Medical History:   Past Medical History:   Diagnosis Date    ELE positive        Surgical History:   Clinton Combs  has a past surgical history that includes back injections.    Medications:   Clinton has a current medication list which includes the following prescription(s): duloxetine, hydroxyzine hcl, lisdexamfetamine, and  meloxicam.    Allergies:   Review of patient's allergies indicates:  No Known Allergies     Imaging, MRI: hip (R) Anterior labral tear and other findings as above.    Prior Therapy: None  Social History:  lives alone  Occupation:   Prior Level of Function: able to run, workout without pain  Current Level of Function: unable to sit, walk, run for extended period of time without hip pain.     Pain:   Current 4/10, worst 6/10, best 3/10   Location: bilateral hip, left shin   Description: Aching, Dull, Throbbing, Grabbing and Tight  Aggravating Factors: Sitting, Standing, Bending, Flexing, Lifting and Getting out of bed/chair  Easing Factors: rest    Pts goals: decrease pain during activity    Objective     Posture: no significant deficits: hip internally rotated bilaterally  Palpation: significant tenderness along bilateral hip flexors  Sensation: no deficits at this time    Range of Motion/Strength:   Thoracolumbar AROM Pain/Dysfunction with Movement   Flexion Full    Extension Full    Right side bending Full    Left side bending Full    Right rotation Full Quadrant testing: minimal discomfort   Left rotation Full Quadrant testing: minimal discomfort     Hip Right Left Pain/Dysfunction with Movement   AROM      flexion 95 110 Stiffness on right   extension 15 20    abduction 40 45    Internal rotation 20 25 Pain on right   External rotation 50 55      Knee Right Left Pain/Dysfunction with Movement   AROM/PROM Full Full      Ankle Right Left Pain/Dysfunction with Movement   AROM/PROM Full Full      L/E MMT Right Left Pain/Dysfunction with Movement   Hip Flexion 4/5 4+/5    Hip Extension 4/5 4/5    Hip Abduction 4/5 4/5    Hip Adduction 4/5 4/5    Hip IR 4+/5 4+/5    Hip ER 4+/5 4+/5    Knee Flexion 4+/5 4+/5    Knee Extension 4+/5 4+/5    Ankle DF 5/5 5/5    Ankle PF 5/5 5/5    Ankle Inversion 5/5 5/5    Ankle Eversion 5/5 5/5    Big Toe Extension 5/5 5/5        Selective Functional Movement Assessment  "([facility]) FN: functional, nonpainful. FP: functional, painful. DP: dysfunctional, painful. DN: dysfunctional, nonpainful.   SLS R: DN: hip hike on opposite side  L: FN: no hike noted, no pain noted   overhead deep squat DP: Increase pain, shifts to right on way down       Flexibility: Hamstring length symmetrical bilaterally    Special Tests:   GABY + on right   FADIR + both    Gait Analysis: No deficits noted in walking    CMS Impairment/Limitation/Restriction for FOTO Knee Survey    Therapist reviewed FOTO scores for Clinton Combs on 3/10/2022.   FOTO documents entered into [employer] - see Media section.    Limitation Score: 39%         TREATMENT   Treatment Time In: 720  Treatment Time Out: 745  Total Treatment time separate from Evaluation: 25 minutes    Clinton received therapeutic exercises to develop strength, posture and core stabilization for 25 minutes including:    Colton test hip flexor stretch 15" x 5 each leg  Lateral squat walks GTB 3 laps  Monster walks GTB 3 laps  Seated IR/ER band, ball squeeze RTB 25x each way  Single leg stance in mirror: 20" hold, 3x each leg - focus on control  Bridges with band GTB 30x  Single leg bridges 20x each leg      Home Exercises Provided and Patient Education Provided     Education provided:   - HEP provided next visit.  - Pt educated to continue single leg balance in mirror and focus on hip balance  - Pt will continue workouts as along as pain does not exceed 4/10 pain      Assessment   Clinton is a 31 y.o. male referred to outpatient Physical Therapy with a medical diagnosis of Bilateral hip, left shin pain. Pt presents with significant hip flexor pain along with shin pain during activity. Pt also gets shin pain with extended sitting. Pt will benefit from core and hip strengthening progressions to improve overall pain.    Pt prognosis is Good.   Pt will benefit from skilled outpatient Physical Therapy to address the deficits stated above and in the chart below, provide " pt/family education, and to maximize pt's level of independence.     Plan of care discussed with patient: Yes  Pt's spiritual, cultural and educational needs considered and patient is agreeable to the plan of care and goals as stated below:     Anticipated Barriers for therapy: chronicity of pain, profession    Medical Necessity is demonstrated by the following  History  Co-morbidities and personal factors that may impact the plan of care Co-morbidities:   young age    Personal Factors:   lifestyle     moderate   Examination  Body Structures and Functions, activity limitations and participation restrictions that may impact the plan of care Body Regions:   lower extremities    Body Systems:    gross symmetry  ROM  strength  gross coordinated movement  balance  gait  transfers    Participation Restrictions:   running    Activity limitations:   Learning and applying knowledge  no deficits    General Tasks and Commands  undertaking a single task    Communication  no deficits    Mobility  lifting and carrying objects  walking    Self care  no deficits    Domestic Life  doing house work (cleaning house, washing dishes, laundry)    Interactions/Relationships  no deficits    Life Areas  no deficits    Community and Social Life  community life  recreation and leisure         high   Clinical Presentation evolving clinical presentation with changing clinical characteristics moderate   Decision Making/ Complexity Score: moderate       Goals:  Short Term Goals (4 Weeks):  1. Patient will be compliant with home exercise program to supplement therapy in restoring pain free function.  2. Patient will improve impaired lower extremity manual muscle tests to >/= 4+/5 to improve dynamic hip/knee support for functional tasks.  3. Patient will demonstrate improved squat and single leg stance positions to show improved functional strengthening    Long Term Goals (8 Weeks):  1. Patient will improve FOTO score to </= 20% limited to decrease  perceived limitation with mobility.   2. Patient will improve impaired lower extremity manual muscle tests to >/= 5/5 to improve dynamic hip/knee support for functional tasks.  3. Patient will demonstrate functional nonpainful squat and single leg stance to show functional improvement.   4. Patient will present with no hip pain during running activity to show functional improvement.        Plan   Plan of care Certification: 3/10/2022 to 5/10/2022.    Outpatient Physical Therapy 2 times weekly for 8 weeks to include the following interventions: Cervical/Lumbar Traction, Electrical Stimulation IFC, Gait Training, Manual Therapy, Moist Heat/ Ice, Neuromuscular Re-ed, Patient Education, Self Care, Therapeutic Activities, Therapeutic Exercise and Ultrasound, ASTYM, Kinesiotaping PRN, Functional Dry Needling    Kj Suhterland, PT, DPT

## 2022-03-14 ENCOUNTER — CLINICAL SUPPORT (OUTPATIENT)
Dept: REHABILITATION | Facility: HOSPITAL | Age: 32
End: 2022-03-14
Attending: STUDENT IN AN ORGANIZED HEALTH CARE EDUCATION/TRAINING PROGRAM
Payer: COMMERCIAL

## 2022-03-14 DIAGNOSIS — M25.562 ACUTE PAIN OF LEFT KNEE: Primary | ICD-10-CM

## 2022-03-14 DIAGNOSIS — M25.552 BILATERAL HIP PAIN: ICD-10-CM

## 2022-03-14 DIAGNOSIS — M25.551 BILATERAL HIP PAIN: ICD-10-CM

## 2022-03-14 PROCEDURE — 97112 NEUROMUSCULAR REEDUCATION: CPT

## 2022-03-14 PROCEDURE — 97140 MANUAL THERAPY 1/> REGIONS: CPT

## 2022-03-14 PROCEDURE — 97110 THERAPEUTIC EXERCISES: CPT

## 2022-03-14 NOTE — PROGRESS NOTES
[facility]   Physical Therapy Treatment Note     Name: Clinton Combs  [facility] Number: [id number]    Therapy Diagnosis:   Encounter Diagnoses   Name Primary?    Acute pain of left knee Yes    Bilateral hip pain      Physician: Bashir Guerrero MD    Visit Date: 3/14/2022    Physician Orders: PT Eval and Treat   Medical Diagnosis from Referral:   M76.892 (ICD-10-CM) - Hamstring tendinitis of left thigh   S86.892D (ICD-10-CM) - Medial tibial stress syndrome, left, subsequent encounter      Evaluation Date: 3/10/2022  Authorization Period Expiration: 12/31/2022  Plan of Care Expiration: 5/10/2022  Visit # / Visits authorized: 1/ 1  FOTO: 1/3        Time In: 700  Time Out: 745  Total Billable Time: 45 minutes     Precautions: Standard    Physician Orders: PT Eval and Treat   Medical Diagnosis from Referral:   M76.892 (ICD-10-CM) - Hamstring tendinitis of left thigh   S86.892D (ICD-10-CM) - Medial tibial stress syndrome, left, subsequent encounter      Evaluation Date: 3/10/2022  Authorization Period Expiration: 12/31/2022  Plan of Care Expiration: 5/10/2022  Visit # / Visits authorized: 1/20 (+1 evaluation)  FOTO: 1/3      Precautions: Standard    PTA Visit #: 0/5     Time In: 5:00 pm  Time Out: 6:00 pm  Total Billable Time: 60 minutes    SUBJECTIVE     Pt reports: mild pain in anterior L hip and L lower leg.      He was compliant with home exercise program.  Response to previous treatment: no significant change.   Functional change: no significant change yet.     Pain: 2/10  Location: L anterior hip and lower leg.      OBJECTIVE     Objective Measures updated at progress report unless specified.     Comparable Signs   Lower leg pain     Treatment     Clinton received the treatments listed below:        MANUAL THERAPY TECHNIQUES were applied for (15) minutes, including:  Lumbar HVLA: R Opening  90/90 Lumbar Distraction: 10 minutes        THERAPEUTIC EXERCISES to develop strength, endurance,  "and ROM for (25) minutes including:  Upright Bike  Supine Hip Flexor Stretch: 10"x5, bilateral   Kneeling Hip Flexor Stretch: 10"x5, bilateral   Prone on Elbows: 3 minutes  Prone Press Ups: 2x10 **increase sets v3        NEUROMUSCULAR RE-EDUCATION to improve muscle activation/motor control for (15) minutes including:  Prone Alternating Arms: 2x10   Prone Alternating Legs: 2x10  Abdominal Bracing: 3 minutes  Bridging: 2x10 **increase sets v3        Patient Education and Home Exercises     Home Exercises Provided and Patient Education Provided     Education provided:   - reviewed HEP for understanding.      Written Home Exercises Provided: Patient instructed to cont prior HEP. Exercises were reviewed and Clinton was able to demonstrate them prior to the end of the session.  Clinton demonstrated good  understanding of the education provided. See EMR under Patient Instructions for exercises provided during therapy sessions    ASSESSMENT   Patient reported decreased lower extremity syptoms after Manual Therapy.  Progressed Therapeutic Exercise by adding prone extension exercises to alleviate pain and improve lumbar ROM.  Progressed Neuromuscular Re-education by initiating core activation and motor control exercises per flowsheet.  Patient tolerated today's treatment well with decreased lower extremity symptoms.      Clinton Is progressing well towards his goals.   Pt prognosis is Excellent.     Pt will continue to benefit from skilled outpatient physical therapy to address the deficits listed in the problem list box on initial evaluation, provide pt/family education and to maximize pt's level of independence in the home and community environment.     Pt's spiritual, cultural and educational needs considered and pt agreeable to plan of care and goals.     Anticipated Barriers for therapy: chronicity of pain, profession     Goals:  Short Term Goals (4 Weeks):  1. Patient will be compliant with home exercise program to " supplement therapy in restoring pain free function.  2. Patient will improve impaired lower extremity manual muscle tests to >/= 4+/5 to improve dynamic hip/knee support for functional tasks.  3. Patient will demonstrate improved squat and single leg stance positions to show improved functional strengthening     Long Term Goals (8 Weeks):  1. Patient will improve FOTO score to </= 20% limited to decrease perceived limitation with mobility.   2. Patient will improve impaired lower extremity manual muscle tests to >/= 5/5 to improve dynamic hip/knee support for functional tasks.  3. Patient will demonstrate functional nonpainful squat and single leg stance to show functional improvement.   4. Patient will present with no hip pain during running activity to show functional improvement.        PLAN   Continue Plan of Care (POC) and progress per patient tolerance. See treatment section for details on planned progressions next session.  Plan of Care Due: 5/10/22    Benitez Sanders, PT

## 2022-03-17 ENCOUNTER — CLINICAL SUPPORT (OUTPATIENT)
Dept: REHABILITATION | Facility: HOSPITAL | Age: 32
End: 2022-03-17
Attending: STUDENT IN AN ORGANIZED HEALTH CARE EDUCATION/TRAINING PROGRAM
Payer: COMMERCIAL

## 2022-03-17 DIAGNOSIS — M25.551 BILATERAL HIP PAIN: ICD-10-CM

## 2022-03-17 DIAGNOSIS — M25.562 ACUTE PAIN OF LEFT KNEE: Primary | ICD-10-CM

## 2022-03-17 DIAGNOSIS — M25.552 BILATERAL HIP PAIN: ICD-10-CM

## 2022-03-17 PROCEDURE — 97110 THERAPEUTIC EXERCISES: CPT | Performed by: PHYSICAL THERAPIST

## 2022-03-17 PROCEDURE — 97112 NEUROMUSCULAR REEDUCATION: CPT | Performed by: PHYSICAL THERAPIST

## 2022-03-17 PROCEDURE — 97140 MANUAL THERAPY 1/> REGIONS: CPT | Performed by: PHYSICAL THERAPIST

## 2022-03-21 ENCOUNTER — CLINICAL SUPPORT (OUTPATIENT)
Dept: REHABILITATION | Facility: HOSPITAL | Age: 32
End: 2022-03-21
Attending: STUDENT IN AN ORGANIZED HEALTH CARE EDUCATION/TRAINING PROGRAM
Payer: COMMERCIAL

## 2022-03-21 DIAGNOSIS — M25.552 BILATERAL HIP PAIN: ICD-10-CM

## 2022-03-21 DIAGNOSIS — M25.551 BILATERAL HIP PAIN: ICD-10-CM

## 2022-03-21 DIAGNOSIS — M25.562 ACUTE PAIN OF LEFT KNEE: Primary | ICD-10-CM

## 2022-03-21 PROCEDURE — 97140 MANUAL THERAPY 1/> REGIONS: CPT

## 2022-03-21 PROCEDURE — 97112 NEUROMUSCULAR REEDUCATION: CPT

## 2022-03-21 PROCEDURE — 97110 THERAPEUTIC EXERCISES: CPT

## 2022-03-21 NOTE — PROGRESS NOTES
"[facility]   Physical Therapy Treatment Note     Name: Clinton Combs  [facility] Number: [id number]    Therapy Diagnosis:   Encounter Diagnoses   Name Primary?    Acute pain of left knee Yes    Bilateral hip pain      Physician: Bashir Guerrero MD    Visit Date: 3/21/2022    Physician Orders: PT Eval and Treat   Medical Diagnosis from Referral:   M76.892 (ICD-10-CM) - Hamstring tendinitis of left thigh   S86.892D (ICD-10-CM) - Medial tibial stress syndrome, left, subsequent encounter      Evaluation Date: 3/10/2022  Authorization Period Expiration: 2022  Plan of Care Expiration: 5/10/2022  Visit # / Visits authorized: 3/20  FOTO: 1/3     Precautions: Standard    PTA Visit #: 0/5     Time In: 2:55 pm  Time Out: 3:30 pm  Total Billable Time: 35 minutes    SUBJECTIVE     Pt reports: mild pain into his L lower extremity.  Patient reports overall decreased frequency of pain.    He was compliant with home exercise program.  Response to previous treatment: no significant change.   Functional change: decreased frequency of pain.      Pain: 2/10  Location: L anterior hip and lower leg.      OBJECTIVE     Objective Measures updated at progress report unless specified.     Comparable Signs   Lower leg pain     Treatment     Clinton received the treatments listed below:        MANUAL THERAPY TECHNIQUES were applied for (10) minutes, includin/90 Lumbar Distraction: 10 minutes        THERAPEUTIC EXERCISES to develop strength, endurance, and ROM for (12) minutes including:  Upright Bike  Supine Hip Flexor Stretch: 10"x5, bilateral   Prone Press Ups: 3x10        NEUROMUSCULAR RE-EDUCATION to improve muscle activation/motor control for (10) minutes including:  Standing Clamshell: Yellow Band, 1x10 bilateral   Pallof Press Walkouts: 30# and Yellow Band, 1x10 bilateral         Patient Education and Home Exercises     Home Exercises Provided and Patient Education Provided     Education provided: "   - reviewed HEP for understanding.      Written Home Exercises Provided: Patient instructed to cont prior HEP. Exercises were reviewed and Clinton was able to demonstrate them prior to the end of the session.  Clinton demonstrated good  understanding of the education provided. See EMR under Patient Instructions for exercises provided during therapy sessions    ASSESSMENT   Patient reported decreased lower extremity syptoms after Manual Therapy.  Progressed Therapeutic Exercise by increasing sets of Prone Press Ups to improve lumbar extension ROM.  Progressed Neuromuscular Re-education by adding Standing Clamshells to improve motor control of lateral and posterior hip musculature.     Clinton Is progressing well towards his goals.   Pt prognosis is Excellent.     Pt will continue to benefit from skilled outpatient physical therapy to address the deficits listed in the problem list box on initial evaluation, provide pt/family education and to maximize pt's level of independence in the home and community environment.     Pt's spiritual, cultural and educational needs considered and pt agreeable to plan of care and goals.     Anticipated Barriers for therapy: chronicity of pain, profession    Goals:  Short Term Goals (4 Weeks):  1. Patient will be compliant with home exercise program to supplement therapy in restoring pain free function.  Progressing  2. Patient will improve impaired lower extremity manual muscle tests to >/= 4+/5 to improve dynamic hip/knee support for functional tasks.  3. Patient will demonstrate improved squat and single leg stance positions to show improved functional strengthening     Long Term Goals (8 Weeks):  1. Patient will improve FOTO score to </= 20% limited to decrease perceived limitation with mobility.   2. Patient will improve impaired lower extremity manual muscle tests to >/= 5/5 to improve dynamic hip/knee support for functional tasks.  3. Patient will demonstrate functional nonpainful  squat and single leg stance to show functional improvement.   4. Patient will present with no hip pain during running activity to show functional improvement.      PLAN   Continue Plan of Care (POC) and progress per patient tolerance. See treatment section for details on planned progressions next session.  Plan of Care Due: 5/10/22      Benitez Sanders, PT

## 2022-03-22 NOTE — PROGRESS NOTES
"[facility]   Physical Therapy Treatment Note     Name: Clinton Combs  [facility] Number: [id number]    Therapy Diagnosis:   Encounter Diagnoses   Name Primary?    Acute pain of left knee Yes    Bilateral hip pain      Physician: Bashir Guerrero MD    Visit Date: 3/17/2022    Physician Orders: PT Eval and Treat   Medical Diagnosis from Referral:   M76.892 (ICD-10-CM) - Hamstring tendinitis of left thigh   S86.892D (ICD-10-CM) - Medial tibial stress syndrome, left, subsequent encounter      Evaluation Date: 3/10/2022  Authorization Period Expiration: 12/31/2022  Plan of Care Expiration: 5/10/2022  Visit # / Visits authorized: 2/20 (+1 evaluation)  FOTO: 1/3      Precautions: Standard    PTA Visit #: 0/5     Time In: 4:00 pm  Time Out: 4:45 pm  Total Billable Time: 45 minutes    SUBJECTIVE     Pt reports: pain, discomfort still present during specific activity.     He was compliant with home exercise program.  Response to previous treatment: no significant change.   Functional change: no significant change yet.     Pain: 2/10  Location: L anterior hip and lower leg.      OBJECTIVE     Objective Measures updated at progress report unless specified.     Comparable Signs   Lower leg pain     Treatment     Clinton received the treatments listed below:        MANUAL THERAPY TECHNIQUES were applied for (10) minutes, including:  Lumbar HVLA: R Opening  90/90 Lumbar Distraction: 10 minutes        THERAPEUTIC EXERCISES to develop strength, endurance, and ROM for (15) minutes including:  Upright Bike  Supine Hip Flexor Stretch: 10"x5, bilateral   Kneeling Hip Flexor Stretch: 10"x5, bilateral   Prone on Elbows: 3 minutes  Prone Press Ups: 3x10       NEUROMUSCULAR RE-EDUCATION to improve muscle activation/motor control for (20) minutes including:  Prone Alternating Arms: 2x10   Prone Alternating Legs: 2x10  Abdominal Bracing: 3 minutes  Bridging: 3x10 with GTB  Lateral squat walks GTB 3 laps  Monster " Walks GTB 2 laps  Standing clamshells GTB 30x  Prone plank with hip EXT 2x10      Patient Education and Home Exercises     Home Exercises Provided and Patient Education Provided     Education provided:   - reviewed HEP for understanding.      Written Home Exercises Provided: Patient instructed to cont prior HEP. Exercises were reviewed and Clinton was able to demonstrate them prior to the end of the session.  Clinton demonstrated good  understanding of the education provided. See EMR under Patient Instructions for exercises provided during therapy sessions    ASSESSMENT   Patient continues to have discomfort with certain activity in hips and shin. Pt was able to increase hip strengthening progression today without significant symptoms. Will continue to work on lumbar extension along with hip strengthening to improve overall stability with activity.    Clinton Is progressing well towards his goals.   Pt prognosis is Excellent.     Pt will continue to benefit from skilled outpatient physical therapy to address the deficits listed in the problem list box on initial evaluation, provide pt/family education and to maximize pt's level of independence in the home and community environment.     Pt's spiritual, cultural and educational needs considered and pt agreeable to plan of care and goals.     Anticipated Barriers for therapy: chronicity of pain, profession     Goals:  Short Term Goals (4 Weeks):  1. Patient will be compliant with home exercise program to supplement therapy in restoring pain free function.  2. Patient will improve impaired lower extremity manual muscle tests to >/= 4+/5 to improve dynamic hip/knee support for functional tasks.  3. Patient will demonstrate improved squat and single leg stance positions to show improved functional strengthening     Long Term Goals (8 Weeks):  1. Patient will improve FOTO score to </= 20% limited to decrease perceived limitation with mobility.   2. Patient will improve  impaired lower extremity manual muscle tests to >/= 5/5 to improve dynamic hip/knee support for functional tasks.  3. Patient will demonstrate functional nonpainful squat and single leg stance to show functional improvement.   4. Patient will present with no hip pain during running activity to show functional improvement.        PLAN   Continue Plan of Care (POC) and progress per patient tolerance. See treatment section for details on planned progressions next session.  Plan of Care Due: 5/10/22    Kj Sutherland PT

## 2022-03-24 ENCOUNTER — CLINICAL SUPPORT (OUTPATIENT)
Dept: REHABILITATION | Facility: HOSPITAL | Age: 32
End: 2022-03-24
Attending: STUDENT IN AN ORGANIZED HEALTH CARE EDUCATION/TRAINING PROGRAM
Payer: COMMERCIAL

## 2022-03-24 DIAGNOSIS — M25.551 BILATERAL HIP PAIN: ICD-10-CM

## 2022-03-24 DIAGNOSIS — M25.562 ACUTE PAIN OF LEFT KNEE: Primary | ICD-10-CM

## 2022-03-24 DIAGNOSIS — M25.552 BILATERAL HIP PAIN: ICD-10-CM

## 2022-03-24 PROCEDURE — 97140 MANUAL THERAPY 1/> REGIONS: CPT | Performed by: PHYSICAL THERAPIST

## 2022-03-24 PROCEDURE — 97112 NEUROMUSCULAR REEDUCATION: CPT | Performed by: PHYSICAL THERAPIST

## 2022-03-24 PROCEDURE — 97110 THERAPEUTIC EXERCISES: CPT | Performed by: PHYSICAL THERAPIST

## 2022-03-24 NOTE — PROGRESS NOTES
"[facility]   Physical Therapy Treatment Note     Name: Clinton Combs  [facility] Number: [id number]    Therapy Diagnosis:   Encounter Diagnoses   Name Primary?    Acute pain of left knee Yes    Bilateral hip pain      Physician: Bashir Guerrero MD    Visit Date: 3/24/2022    Physician Orders: PT Eval and Treat   Medical Diagnosis from Referral:   M76.892 (ICD-10-CM) - Hamstring tendinitis of left thigh   S86.892D (ICD-10-CM) - Medial tibial stress syndrome, left, subsequent encounter      Evaluation Date: 3/10/2022  Authorization Period Expiration: 2022  Plan of Care Expiration: 5/10/2022  Visit # / Visits authorized:   FOTO: 1/3     Precautions: Standard    PTA Visit #: 0/5     Time In: 1130 am  Time Out: 1215 pm  Total Billable Time: 45 minutes    SUBJECTIVE     Pt reports: right hip flared up at this time, no radiating pains though    He was compliant with home exercise program.  Response to previous treatment: no significant change.   Functional change: decreased frequency of pain.      Pain: 210  Location: L anterior hip and lower leg.      OBJECTIVE     Objective Measures updated at progress report unless specified.     Comparable Signs   Lower leg pain     Treatment     Clinton received the treatments listed below:        MANUAL THERAPY TECHNIQUES were applied for (10) minutes, includin/90 Lumbar Distraction: 10 minutes  Single leg distraction, left leg    THERAPEUTIC EXERCISES to develop strength, endurance, and ROM for (10) minutes including:  Upright Bike L4 5 mins  Supine Hip Flexor Stretch: 10"x5, bilateral   Prone Press Ups: 3x10    NEUROMUSCULAR RE-EDUCATION to improve muscle activation/motor control for (25) minutes including:  Standing Clamshell: RTB 30x each leg  SLS on bosu with ball toss- working on keeping hips aligned 20x  Pallof Press Walkouts: 30# 20x each way  Prone plank with hip eXT 2x10  Side plank with leg lift 20x each way      Patient " Education and Home Exercises     Home Exercises Provided and Patient Education Provided     Education provided:   - reviewed HEP for understanding.      Written Home Exercises Provided: Patient instructed to cont prior HEP. Exercises were reviewed and Clinton was able to demonstrate them prior to the end of the session.  Clinton demonstrated good  understanding of the education provided. See EMR under Patient Instructions for exercises provided during therapy sessions    ASSESSMENT   Patient reports right hip decreased in pain during session. No significant flare up at this time. Pt will continue to work on hip strengthening along with hip flexor flexibility to further improve symptoms overall.     Clinton Is progressing well towards his goals.   Pt prognosis is Excellent.     Pt will continue to benefit from skilled outpatient physical therapy to address the deficits listed in the problem list box on initial evaluation, provide pt/family education and to maximize pt's level of independence in the home and community environment.     Pt's spiritual, cultural and educational needs considered and pt agreeable to plan of care and goals.     Anticipated Barriers for therapy: chronicity of pain, profession    Goals:  Short Term Goals (4 Weeks):  1. Patient will be compliant with home exercise program to supplement therapy in restoring pain free function.  Progressing  2. Patient will improve impaired lower extremity manual muscle tests to >/= 4+/5 to improve dynamic hip/knee support for functional tasks.  3. Patient will demonstrate improved squat and single leg stance positions to show improved functional strengthening     Long Term Goals (8 Weeks):  1. Patient will improve FOTO score to </= 20% limited to decrease perceived limitation with mobility.   2. Patient will improve impaired lower extremity manual muscle tests to >/= 5/5 to improve dynamic hip/knee support for functional tasks.  3. Patient will demonstrate  functional nonpainful squat and single leg stance to show functional improvement.   4. Patient will present with no hip pain during running activity to show functional improvement.      PLAN   Continue Plan of Care (POC) and progress per patient tolerance. See treatment section for details on planned progressions next session.  Plan of Care Due: 5/10/22      Kj Sutherland PT

## 2022-03-28 ENCOUNTER — CLINICAL SUPPORT (OUTPATIENT)
Dept: REHABILITATION | Facility: HOSPITAL | Age: 32
End: 2022-03-28
Attending: STUDENT IN AN ORGANIZED HEALTH CARE EDUCATION/TRAINING PROGRAM
Payer: COMMERCIAL

## 2022-03-28 DIAGNOSIS — M25.562 ACUTE PAIN OF LEFT KNEE: Primary | ICD-10-CM

## 2022-03-28 DIAGNOSIS — M25.551 BILATERAL HIP PAIN: ICD-10-CM

## 2022-03-28 DIAGNOSIS — M25.552 BILATERAL HIP PAIN: ICD-10-CM

## 2022-03-28 PROCEDURE — 97140 MANUAL THERAPY 1/> REGIONS: CPT | Performed by: PHYSICAL THERAPIST

## 2022-03-28 PROCEDURE — 97110 THERAPEUTIC EXERCISES: CPT | Performed by: PHYSICAL THERAPIST

## 2022-03-28 PROCEDURE — 97112 NEUROMUSCULAR REEDUCATION: CPT | Performed by: PHYSICAL THERAPIST

## 2022-03-28 NOTE — PROGRESS NOTES
"[facility]   Physical Therapy Treatment Note     Name: Clinton Combs  [facility] Number: [id number]    Therapy Diagnosis:   Encounter Diagnoses   Name Primary?    Acute pain of left knee Yes    Bilateral hip pain      Physician: Bashir Guerrero MD    Visit Date: 3/28/2022    Physician Orders: PT Eval and Treat   Medical Diagnosis from Referral:   M76.892 (ICD-10-CM) - Hamstring tendinitis of left thigh   S86.892D (ICD-10-CM) - Medial tibial stress syndrome, left, subsequent encounter      Evaluation Date: 3/10/2022  Authorization Period Expiration: 2022  Plan of Care Expiration: 5/10/2022  Visit # / Visits authorized:  (+eval)  FOTO: 1/3     Precautions: Standard    PTA Visit #: 0/5     Time In: 730 am  Time Out: 815 am  Total Billable Time: 45 minutes    SUBJECTIVE     Pt reports: walking around over weekend. Continued symptoms but not as severe.     He was compliant with home exercise program.  Response to previous treatment: no significant change.   Functional change: decreased frequency of pain.      Pain: 10  Location: L anterior hip and lower leg.      OBJECTIVE     Objective Measures updated at progress report unless specified.     Comparable Signs   Lower leg pain     Treatment     Clinton received the treatments listed below:        MANUAL THERAPY TECHNIQUES were applied for (15) minutes, includin/90 Lumbar Distraction: 10 minutes  Single leg distraction, left leg    THERAPEUTIC EXERCISES to develop strength, endurance, and ROM for (10) minutes including:    Upright Bike L4 5 mins  Supine Hip Flexor Stretch: 10"x5, bilateral   Prone Press Ups: 3x10    NEUROMUSCULAR RE-EDUCATION to improve muscle activation/motor control for (20) minutes including:    Standing Clamshell: RTB 30x each leg   SLS on bosu with ball toss- working on keeping hips aligned 20x  Pallof Press Walkouts: 40# 15x each way with squat walk Black TB  Prone plank with hip ext 2x10  Side plank " with leg lift 20x each way  Prone plank with sliders - 2x5 each arm    Patient Education and Home Exercises     Home Exercises Provided and Patient Education Provided     Education provided:   - reviewed HEP for understanding.      Written Home Exercises Provided: Patient instructed to cont prior HEP. Exercises were reviewed and Clinton was able to demonstrate them prior to the end of the session.  Clinton demonstrated good  understanding of the education provided. See EMR under Patient Instructions for exercises provided during therapy sessions    ASSESSMENT     Pt tolerated treatment well with no increase in pain. Pt does feel more loose in general following session. Pt did have right sided pain upon waking up which transferred over to left side. Pt will continue to benefit from core and hip strengthening to improve work activity.     Clinton Is progressing well towards his goals.   Pt prognosis is Excellent.     Pt will continue to benefit from skilled outpatient physical therapy to address the deficits listed in the problem list box on initial evaluation, provide pt/family education and to maximize pt's level of independence in the home and community environment.     Pt's spiritual, cultural and educational needs considered and pt agreeable to plan of care and goals.     Anticipated Barriers for therapy: chronicity of pain, profession    Goals:  Short Term Goals (4 Weeks):  1. Patient will be compliant with home exercise program to supplement therapy in restoring pain free function.  Progressing  2. Patient will improve impaired lower extremity manual muscle tests to >/= 4+/5 to improve dynamic hip/knee support for functional tasks.  3. Patient will demonstrate improved squat and single leg stance positions to show improved functional strengthening     Long Term Goals (8 Weeks):  1. Patient will improve FOTO score to </= 20% limited to decrease perceived limitation with mobility.   2. Patient will improve impaired  lower extremity manual muscle tests to >/= 5/5 to improve dynamic hip/knee support for functional tasks.  3. Patient will demonstrate functional nonpainful squat and single leg stance to show functional improvement.   4. Patient will present with no hip pain during running activity to show functional improvement.      PLAN   Continue Plan of Care (POC) and progress per patient tolerance. See treatment section for details on planned progressions next session.  Plan of Care Due: 5/10/22      Kj Sutherland, PT

## 2022-03-30 ENCOUNTER — CLINICAL SUPPORT (OUTPATIENT)
Dept: REHABILITATION | Facility: HOSPITAL | Age: 32
End: 2022-03-30
Attending: STUDENT IN AN ORGANIZED HEALTH CARE EDUCATION/TRAINING PROGRAM
Payer: COMMERCIAL

## 2022-03-30 DIAGNOSIS — M25.551 BILATERAL HIP PAIN: ICD-10-CM

## 2022-03-30 DIAGNOSIS — M25.562 ACUTE PAIN OF LEFT KNEE: Primary | ICD-10-CM

## 2022-03-30 DIAGNOSIS — M25.552 BILATERAL HIP PAIN: ICD-10-CM

## 2022-03-30 PROCEDURE — 97140 MANUAL THERAPY 1/> REGIONS: CPT | Performed by: PHYSICAL THERAPIST

## 2022-03-30 PROCEDURE — 97112 NEUROMUSCULAR REEDUCATION: CPT | Performed by: PHYSICAL THERAPIST

## 2022-03-30 PROCEDURE — 97110 THERAPEUTIC EXERCISES: CPT | Performed by: PHYSICAL THERAPIST

## 2022-03-30 NOTE — PROGRESS NOTES
"[facility]   Physical Therapy Treatment Note     Name: Clinton Combs  [facility] Number: [id number]    Therapy Diagnosis:   Encounter Diagnoses   Name Primary?    Acute pain of left knee Yes    Bilateral hip pain      Physician: Bashir Guerrero MD    Visit Date: 3/30/2022    Physician Orders: PT Eval and Treat   Medical Diagnosis from Referral:   M76.892 (ICD-10-CM) - Hamstring tendinitis of left thigh   S86.892D (ICD-10-CM) - Medial tibial stress syndrome, left, subsequent encounter      Evaluation Date: 3/10/2022  Authorization Period Expiration: 2022  Plan of Care Expiration: 5/10/2022  Visit # / Visits authorized:  (+eval)  FOTO: 1/3     Precautions: Standard    PTA Visit #: 0/5     Time In: 830 am  Time Out: 915 am  Total Billable Time: 45 minutes    SUBJECTIVE     Pt reports: lower left leg symptoms not as bad as before. Right hip still flares up but feels better running following PT sessions    He was compliant with home exercise program.  Response to previous treatment: no significant change.   Functional change: decreased frequency of pain.      Pain: 2/10  Location: L anterior hip and lower leg.      OBJECTIVE     Objective Measures updated at progress report unless specified.     Comparable Signs   Lower leg pain     Treatment     Clinton received the treatments listed below:        MANUAL THERAPY TECHNIQUES were applied for (10) minutes, includin/90 Lumbar Distraction: 10 minutes  Single leg distraction, both legs  sidelying rotational mobilizations, each side    THERAPEUTIC EXERCISES to develop strength, endurance, and ROM for (10) minutes including:    Upright Bike L4 5 mins  Supine Hip Flexor Stretch: 10"x5, bilateral   Prone Press Ups: 3x10  Hip flexor eccentric with stability ball 20x each leg    NEUROMUSCULAR RE-EDUCATION to improve muscle activation/motor control for (25) minutes including:    Standing Clamshell: BTB 30x each leg   SLS on bosu with KB " around body - each leg, 10x each way 1 set  Tall kneeling pallof press 4 plates 15x each way  Prone plank with hip ext 2x10  Side plank with leg lift 20x each way  Prone plank with sliders - 2x5 each arm    Patient Education and Home Exercises     Home Exercises Provided and Patient Education Provided     Education provided:   - reviewed HEP for understanding.      Written Home Exercises Provided: Patient instructed to cont prior HEP. Exercises were reviewed and Clinton was able to demonstrate them prior to the end of the session.  Clinton demonstrated good  understanding of the education provided. See EMR under Patient Instructions for exercises provided during therapy sessions    ASSESSMENT     Pt feels improving low back and hip stiffness during workouts at home. Pt still has right hip flare ups at times but lower left leg symptoms have gotten better overall. Pt will continue to perform workouts at home and monitor symptoms.      Clinton Is progressing well towards his goals.   Pt prognosis is Excellent.     Pt will continue to benefit from skilled outpatient physical therapy to address the deficits listed in the problem list box on initial evaluation, provide pt/family education and to maximize pt's level of independence in the home and community environment.     Pt's spiritual, cultural and educational needs considered and pt agreeable to plan of care and goals.     Anticipated Barriers for therapy: chronicity of pain, profession    Goals:  Short Term Goals (4 Weeks):  1. Patient will be compliant with home exercise program to supplement therapy in restoring pain free function.  Progressing  2. Patient will improve impaired lower extremity manual muscle tests to >/= 4+/5 to improve dynamic hip/knee support for functional tasks.  3. Patient will demonstrate improved squat and single leg stance positions to show improved functional strengthening     Long Term Goals (8 Weeks):  1. Patient will improve FOTO score to  </= 20% limited to decrease perceived limitation with mobility.   2. Patient will improve impaired lower extremity manual muscle tests to >/= 5/5 to improve dynamic hip/knee support for functional tasks.  3. Patient will demonstrate functional nonpainful squat and single leg stance to show functional improvement.   4. Patient will present with no hip pain during running activity to show functional improvement.      PLAN   Continue Plan of Care (POC) and progress per patient tolerance. See treatment section for details on planned progressions next session.  Plan of Care Due: 5/10/22      Kj Sutherland, PT

## 2022-04-04 ENCOUNTER — CLINICAL SUPPORT (OUTPATIENT)
Dept: REHABILITATION | Facility: HOSPITAL | Age: 32
End: 2022-04-04
Attending: STUDENT IN AN ORGANIZED HEALTH CARE EDUCATION/TRAINING PROGRAM
Payer: COMMERCIAL

## 2022-04-04 DIAGNOSIS — M25.552 BILATERAL HIP PAIN: ICD-10-CM

## 2022-04-04 DIAGNOSIS — M25.562 ACUTE PAIN OF LEFT KNEE: Primary | ICD-10-CM

## 2022-04-04 DIAGNOSIS — M25.551 BILATERAL HIP PAIN: ICD-10-CM

## 2022-04-04 PROCEDURE — 97140 MANUAL THERAPY 1/> REGIONS: CPT | Performed by: PHYSICAL THERAPIST

## 2022-04-04 PROCEDURE — 97110 THERAPEUTIC EXERCISES: CPT | Performed by: PHYSICAL THERAPIST

## 2022-04-04 PROCEDURE — 97112 NEUROMUSCULAR REEDUCATION: CPT | Performed by: PHYSICAL THERAPIST

## 2022-04-04 NOTE — PROGRESS NOTES
"[facility]   Physical Therapy Treatment Note     Name: Clinton Combs  [facility] Number: [id number]    Therapy Diagnosis:   Encounter Diagnoses   Name Primary?    Acute pain of left knee Yes    Bilateral hip pain      Physician: Bashir Guerrero MD    Visit Date: 2022    Physician Orders: PT Eval and Treat   Medical Diagnosis from Referral:   M76.892 (ICD-10-CM) - Hamstring tendinitis of left thigh   S86.892D (ICD-10-CM) - Medial tibial stress syndrome, left, subsequent encounter      Evaluation Date: 3/10/2022  Authorization Period Expiration: 2022  Plan of Care Expiration: 5/10/2022  Visit # / Visits authorized:  (+eval)  FOTO: 1/3     Precautions: Standard    PTA Visit #: 0/5     Time In: 730 am  Time Out: 815 am  Total Billable Time: 45 minutes    SUBJECTIVE     Pt reports: running around the [employer] over weekend, 1 mile in had calf tightness and then 2 miles in, had numbness along lateral knee. Pt reports some anterior hip pain as well.     He was compliant with home exercise program.  Response to previous treatment: no significant change.   Functional change: decreased frequency of pain.      Pain: 2/10  Location: L anterior hip and lower leg.      OBJECTIVE     Objective Measures updated at progress report unless specified.     Comparable Signs   Lower leg pain     Treatment     Clinton received the treatments listed below:        MANUAL THERAPY TECHNIQUES were applied for (15) minutes, includin/90 Lumbar Distraction: 10 minutes  Single leg distraction, both legs  Lateral hip distraction with MWM in IR and ER  sidelying rotational mobilizations, each side    THERAPEUTIC EXERCISES to develop strength, endurance, and ROM for (15) minutes including:    Upright Bike L4 5 mins  Supine Hip Flexor Stretch: 10"x5, bilateral   Prone Press Ups: 3x10  Hip flexor eccentric with stability ball 20x each leg  Prone UE and LE on stability ball 20x each  Clamshells 3x12 BLE blue " TB  Bridges with BTB 30x    NEUROMUSCULAR RE-EDUCATION to improve muscle activation/motor control for (15) minutes including:    Standing Clamshell: BTB 30x each leg   SLS on bosu with KB around body - each leg, 10x each way 1 set  Tall kneeling pallof press 4 plates 15x each way  Prone plank with hip ext 2x10  Side plank with leg lift 20x each way  Prone plank with sliders - 2x5 each arm    Patient Education and Home Exercises     Home Exercises Provided and Patient Education Provided     Education provided:   - reviewed HEP for understanding.      Written Home Exercises Provided: Patient instructed to cont prior HEP. Exercises were reviewed and Clinton was able to demonstrate them prior to the end of the session.  Clinton demonstrated good  understanding of the education provided. See EMR under Patient Instructions for exercises provided during therapy sessions    ASSESSMENT     Pt tolerated full treatment session with no increase in pain. Pt feels decreased stiffness following sessions but still gets pain when running. Pt educated to decrease leg press inclined to ceiling and begin more 90 degree leg press straight out. Pt will continue to benefit from core and hip strengthening program to stabilize lumbar spine.     Clinton Is progressing well towards his goals.   Pt prognosis is Excellent.     Pt will continue to benefit from skilled outpatient physical therapy to address the deficits listed in the problem list box on initial evaluation, provide pt/family education and to maximize pt's level of independence in the home and community environment.     Pt's spiritual, cultural and educational needs considered and pt agreeable to plan of care and goals.     Anticipated Barriers for therapy: chronicity of pain, profession    Goals:  Short Term Goals (4 Weeks):  1. Patient will be compliant with home exercise program to supplement therapy in restoring pain free function.  Progressing  2. Patient will improve impaired  lower extremity manual muscle tests to >/= 4+/5 to improve dynamic hip/knee support for functional tasks.  3. Patient will demonstrate improved squat and single leg stance positions to show improved functional strengthening     Long Term Goals (8 Weeks):  1. Patient will improve FOTO score to </= 20% limited to decrease perceived limitation with mobility.   2. Patient will improve impaired lower extremity manual muscle tests to >/= 5/5 to improve dynamic hip/knee support for functional tasks.  3. Patient will demonstrate functional nonpainful squat and single leg stance to show functional improvement.   4. Patient will present with no hip pain during running activity to show functional improvement.      PLAN   Continue Plan of Care (POC) and progress per patient tolerance. See treatment section for details on planned progressions next session.  Plan of Care Due: 5/10/22      Kj Sutherland PT

## 2022-04-07 ENCOUNTER — CLINICAL SUPPORT (OUTPATIENT)
Dept: REHABILITATION | Facility: HOSPITAL | Age: 32
End: 2022-04-07
Attending: STUDENT IN AN ORGANIZED HEALTH CARE EDUCATION/TRAINING PROGRAM
Payer: COMMERCIAL

## 2022-04-07 DIAGNOSIS — M25.551 BILATERAL HIP PAIN: ICD-10-CM

## 2022-04-07 DIAGNOSIS — M25.562 ACUTE PAIN OF LEFT KNEE: Primary | ICD-10-CM

## 2022-04-07 DIAGNOSIS — M25.552 BILATERAL HIP PAIN: ICD-10-CM

## 2022-04-07 PROCEDURE — 97112 NEUROMUSCULAR REEDUCATION: CPT | Performed by: PHYSICAL THERAPIST

## 2022-04-07 PROCEDURE — 97140 MANUAL THERAPY 1/> REGIONS: CPT | Performed by: PHYSICAL THERAPIST

## 2022-04-07 PROCEDURE — 97110 THERAPEUTIC EXERCISES: CPT | Performed by: PHYSICAL THERAPIST

## 2022-04-07 NOTE — PROGRESS NOTES
"[facility]   Physical Therapy Treatment Note     Name: Clinton Combs  [facility] Number: [id number]    Therapy Diagnosis:   Encounter Diagnoses   Name Primary?    Acute pain of left knee Yes    Bilateral hip pain      Physician: Bashir Guerrero MD    Visit Date: 2022    Physician Orders: PT Eval and Treat   Medical Diagnosis from Referral:   M76.892 (ICD-10-CM) - Hamstring tendinitis of left thigh   S86.892D (ICD-10-CM) - Medial tibial stress syndrome, left, subsequent encounter      Evaluation Date: 3/10/2022  Authorization Period Expiration: 2022  Plan of Care Expiration: 5/10/2022  Visit # / Visits authorized:  (+eval)  FOTO: 1/3     Precautions: Standard    PTA Visit #: 0/5     Time In: 705 am  Time Out: 745 am  Total Billable Time: 40 minutes    SUBJECTIVE     Pt reports: improving stiffness as a whole but still has symptoms when sitting for extended period of time.     He was compliant with home exercise program.  Response to previous treatment: no significant change.   Functional change: decreased frequency of pain.      Pain: 2/10  Location: L anterior hip and lower leg.      OBJECTIVE     Objective Measures updated at progress report unless specified.     Treatment     Clinton received the treatments listed below:        MANUAL THERAPY TECHNIQUES were applied for (15) minutes, includin/90 Lumbar Distraction: 10 minutes  Single leg distraction, left leg  Lateral hip distraction with MWM in IR and ER  sidelying rotational mobilizations, each side    THERAPEUTIC EXERCISES to develop strength, endurance, and ROM for (15) minutes including:    Upright Bike L4 5 mins  Supine Hip Flexor Stretch: 10"x5, bilateral   Prone Press Ups: 3x10  Hip flexor eccentric with stability ball 20x each leg  Prone UE and LE on stability ball 20x each  Single leg bridges with ball 20x each    NEUROMUSCULAR RE-EDUCATION to improve muscle activation/motor control for (15) minutes " including:    Standing Clamshell: BTB 30x each leg   SLS on bosu with KB around body - each leg, 10x each way 1 set  Tall kneeling pallof press 4 plates 15x each way  Prone plank with hip ext 2x10  Pallof press 5 plates, 20x each way  Side plank with leg lift 20x each way  Prone plank with sliders - 2x5 each arm    Patient Education and Home Exercises     Home Exercises Provided and Patient Education Provided     Education provided:   - reviewed HEP for understanding.      Written Home Exercises Provided: Patient instructed to cont prior HEP. Exercises were reviewed and Clinton was able to demonstrate them prior to the end of the session.  Clinton demonstrated good  understanding of the education provided. See EMR under Patient Instructions for exercises provided during therapy sessions    ASSESSMENT     Pt continue to shows improvement with lumbar spine stiffness and lower leg symptoms. Hip pain remains similar. Pt educated to continue stability and prone spine work over any forced flexed abdominal work. Pt will continue to monitor symptoms with certain positions at work and with activity.     Clinton Is progressing well towards his goals.   Pt prognosis is Excellent.     Pt will continue to benefit from skilled outpatient physical therapy to address the deficits listed in the problem list box on initial evaluation, provide pt/family education and to maximize pt's level of independence in the home and community environment.     Pt's spiritual, cultural and educational needs considered and pt agreeable to plan of care and goals.     Anticipated Barriers for therapy: chronicity of pain, profession    Goals:  Short Term Goals (4 Weeks):  1. Patient will be compliant with home exercise program to supplement therapy in restoring pain free function.  Progressing  2. Patient will improve impaired lower extremity manual muscle tests to >/= 4+/5 to improve dynamic hip/knee support for functional tasks.  3. Patient will  demonstrate improved squat and single leg stance positions to show improved functional strengthening     Long Term Goals (8 Weeks):  1. Patient will improve FOTO score to </= 20% limited to decrease perceived limitation with mobility.   2. Patient will improve impaired lower extremity manual muscle tests to >/= 5/5 to improve dynamic hip/knee support for functional tasks.  3. Patient will demonstrate functional nonpainful squat and single leg stance to show functional improvement.   4. Patient will present with no hip pain during running activity to show functional improvement.      PLAN   Continue Plan of Care (POC) and progress per patient tolerance. See treatment section for details on planned progressions next session.  Plan of Care Due: 5/10/22      Kj Sutherland, PT

## 2022-04-11 ENCOUNTER — CLINICAL SUPPORT (OUTPATIENT)
Dept: REHABILITATION | Facility: HOSPITAL | Age: 32
End: 2022-04-11
Attending: STUDENT IN AN ORGANIZED HEALTH CARE EDUCATION/TRAINING PROGRAM
Payer: COMMERCIAL

## 2022-04-11 DIAGNOSIS — M25.562 ACUTE PAIN OF LEFT KNEE: Primary | ICD-10-CM

## 2022-04-11 DIAGNOSIS — M25.552 BILATERAL HIP PAIN: ICD-10-CM

## 2022-04-11 DIAGNOSIS — M25.551 BILATERAL HIP PAIN: ICD-10-CM

## 2022-04-11 PROCEDURE — 97140 MANUAL THERAPY 1/> REGIONS: CPT | Performed by: PHYSICAL THERAPIST

## 2022-04-11 PROCEDURE — 97110 THERAPEUTIC EXERCISES: CPT | Performed by: PHYSICAL THERAPIST

## 2022-04-11 PROCEDURE — 97112 NEUROMUSCULAR REEDUCATION: CPT | Performed by: PHYSICAL THERAPIST

## 2022-04-14 ENCOUNTER — CLINICAL SUPPORT (OUTPATIENT)
Dept: REHABILITATION | Facility: HOSPITAL | Age: 32
End: 2022-04-14
Attending: STUDENT IN AN ORGANIZED HEALTH CARE EDUCATION/TRAINING PROGRAM
Payer: COMMERCIAL

## 2022-04-14 DIAGNOSIS — M25.551 BILATERAL HIP PAIN: ICD-10-CM

## 2022-04-14 DIAGNOSIS — M25.562 ACUTE PAIN OF LEFT KNEE: Primary | ICD-10-CM

## 2022-04-14 DIAGNOSIS — M25.552 BILATERAL HIP PAIN: ICD-10-CM

## 2022-04-14 PROCEDURE — 97110 THERAPEUTIC EXERCISES: CPT | Performed by: PHYSICAL THERAPIST

## 2022-04-14 PROCEDURE — 97140 MANUAL THERAPY 1/> REGIONS: CPT | Performed by: PHYSICAL THERAPIST

## 2022-04-14 PROCEDURE — 97112 NEUROMUSCULAR REEDUCATION: CPT | Performed by: PHYSICAL THERAPIST

## 2022-04-16 NOTE — PROGRESS NOTES
"[facility]   Physical Therapy Treatment Note     Name: Clinton Combs  [facility] Number: [id number]    Therapy Diagnosis:   Encounter Diagnoses   Name Primary?    Acute pain of left knee Yes    Bilateral hip pain      Physician: Basihr Guerrero MD    Visit Date: 2022    Physician Orders: PT Eval and Treat   Medical Diagnosis from Referral:   M76.892 (ICD-10-CM) - Hamstring tendinitis of left thigh   S86.892D (ICD-10-CM) - Medial tibial stress syndrome, left, subsequent encounter      Evaluation Date: 3/10/2022  Authorization Period Expiration: 2022  Plan of Care Expiration: 5/10/2022  Visit # / Visits authorized:  (+eval)  FOTO: 1/3     Precautions: Standard    PTA Visit #: 0/5     Time In: 730 am  Time Out: 815 am  Total Billable Time: 40 minutes    SUBJECTIVE     Pt reports: went for run over weekend, had symptoms about a mile to 2 miles in and had to walk more often.     He was compliant with home exercise program.  Response to previous treatment: no significant change.   Functional change: decreased frequency of pain.      Pain: 2/10  Location: L anterior hip and lower leg.      OBJECTIVE     Objective Measures updated at progress report unless specified.     Treatment     Clinton received the treatments listed below:        MANUAL THERAPY TECHNIQUES were applied for (10) minutes, includin/90 Lumbar Distraction: 10 minutes  Single leg distraction, left leg  Lateral hip distraction with MWM in IR and ER  sidelying rotational mobilizations, each side    THERAPEUTIC EXERCISES to develop strength, endurance, and ROM for (15) minutes including:    Upright Bike L4 5 mins  Supine Hip Flexor Stretch: 10"x5, bilateral   Prone Press Ups: 3x10  Hip flexor eccentric with stability ball 20x each leg  Prone UE and LE on stability ball 20x each 2# weights  Single leg bridges with GTB 20x each  Hamstring bridges, 12" box. 20x    NEUROMUSCULAR RE-EDUCATION to improve muscle " activation/motor control for (15) minutes including:    Standing Clamshell: BTB 30x each leg   SLS on bosu with KB around body - each leg, 10x each way 1 set  Tall kneeling pallof press 4 plates 15x each way  Prone plank with hip ext 2x10  Pallof press walkouts with RTB 5 plates, 20x each way  Side plank with leg lift 20x each way  Prone plank with sliders - 2x5 each arm    Patient Education and Home Exercises     Home Exercises Provided and Patient Education Provided     Education provided:   - reviewed HEP for understanding.      Written Home Exercises Provided: Patient instructed to cont prior HEP. Exercises were reviewed and Clinton was able to demonstrate them prior to the end of the session.  Clinton demonstrated good  understanding of the education provided. See EMR under Patient Instructions for exercises provided during therapy sessions    ASSESSMENT     Pt tolerated session today with no increase in pain overall. Pt feels decrease in stiffness during session. Pt will continue HEP and increase hip strengthening workouts in order to improve symptoms with activity.     Clinton Is progressing well towards his goals.   Pt prognosis is Excellent.     Pt will continue to benefit from skilled outpatient physical therapy to address the deficits listed in the problem list box on initial evaluation, provide pt/family education and to maximize pt's level of independence in the home and community environment.     Pt's spiritual, cultural and educational needs considered and pt agreeable to plan of care and goals.     Anticipated Barriers for therapy: chronicity of pain, profession    Goals:  Short Term Goals (4 Weeks):  1. Patient will be compliant with home exercise program to supplement therapy in restoring pain free function.  Progressing  2. Patient will improve impaired lower extremity manual muscle tests to >/= 4+/5 to improve dynamic hip/knee support for functional tasks.  3. Patient will demonstrate improved squat  and single leg stance positions to show improved functional strengthening     Long Term Goals (8 Weeks):  1. Patient will improve FOTO score to </= 20% limited to decrease perceived limitation with mobility.   2. Patient will improve impaired lower extremity manual muscle tests to >/= 5/5 to improve dynamic hip/knee support for functional tasks.  3. Patient will demonstrate functional nonpainful squat and single leg stance to show functional improvement.   4. Patient will present with no hip pain during running activity to show functional improvement.      PLAN   Continue Plan of Care (POC) and progress per patient tolerance. See treatment section for details on planned progressions next session.  Plan of Care Due: 5/10/22      Kj Sutherland, PT

## 2022-04-18 ENCOUNTER — CLINICAL SUPPORT (OUTPATIENT)
Dept: REHABILITATION | Facility: HOSPITAL | Age: 32
End: 2022-04-18
Payer: COMMERCIAL

## 2022-04-18 DIAGNOSIS — M25.552 BILATERAL HIP PAIN: ICD-10-CM

## 2022-04-18 DIAGNOSIS — M25.562 ACUTE PAIN OF LEFT KNEE: Primary | ICD-10-CM

## 2022-04-18 DIAGNOSIS — M25.551 BILATERAL HIP PAIN: ICD-10-CM

## 2022-04-18 PROCEDURE — 97140 MANUAL THERAPY 1/> REGIONS: CPT | Performed by: PHYSICAL THERAPIST

## 2022-04-18 PROCEDURE — 97112 NEUROMUSCULAR REEDUCATION: CPT | Performed by: PHYSICAL THERAPIST

## 2022-04-18 PROCEDURE — 97110 THERAPEUTIC EXERCISES: CPT | Performed by: PHYSICAL THERAPIST

## 2022-04-18 NOTE — PROGRESS NOTES
"[facility]   Physical Therapy Treatment Note     Name: Clinton Combs  [facility] Number: [id number]    Therapy Diagnosis:   Encounter Diagnoses   Name Primary?    Acute pain of left knee Yes    Bilateral hip pain      Physician: Bashir Guerrero MD    Visit Date: 2022    Physician Orders: PT Eval and Treat   Medical Diagnosis from Referral:   M76.892 (ICD-10-CM) - Hamstring tendinitis of left thigh   S86.892D (ICD-10-CM) - Medial tibial stress syndrome, left, subsequent encounter      Evaluation Date: 3/10/2022  Authorization Period Expiration: 2022  Plan of Care Expiration: 5/10/2022  Visit # / Visits authorized: 10/20 (+eval)  FOTO: 1/3     Precautions: Standard    PTA Visit #: 0/5     Time In: 700 am  Time Out: 755 am  Total Billable Time: 55 minutes    SUBJECTIVE     Pt reports: left leg symptoms when running for long distances. Sitting more recently as well which makes low back a little more stiff.     He was compliant with home exercise program.  Response to previous treatment: no significant change.   Functional change: decreased frequency of pain.      Pain: 2/10  Location: L anterior hip and lower leg.      OBJECTIVE     Objective Measures updated at progress report unless specified.     Treatment     Clinton received the treatments listed below:        MANUAL THERAPY TECHNIQUES were applied for (15) minutes, includin/90 Lumbar Distraction: 10 minutes  Single leg distraction, left leg  sidelying rotational mobilizations, each side    THERAPEUTIC EXERCISES to develop strength, endurance, and ROM for (25) minutes including:    Upright Bike L4 5 mins  Hip flexor eccentric with stability ball 20x each leg  Prone UE and LE on stability ball 20x each 2# weights  Single leg bridges with GTB 20x each  Hamstring bridges, 12" box. 20x  Hamstring roll outs with foam roller 12x    NEUROMUSCULAR RE-EDUCATION to improve muscle activation/motor control for (15) minutes " including:    Standing Clamshell: BTB 30x each leg   Prone plank with hip ext 2x10  Pallof press walkouts with RTB 5 plates, 20x each way  Side plank with leg lift 20x each way  SLS ball toss, 3 ways. 2x8 each way with red ball.         Patient Education and Home Exercises     Home Exercises Provided and Patient Education Provided     Education provided:   - reviewed HEP for understanding.      Written Home Exercises Provided: Patient instructed to cont prior HEP. Exercises were reviewed and Clinton was able to demonstrate them prior to the end of the session.  Clinton demonstrated good  understanding of the education provided. See EMR under Patient Instructions for exercises provided during therapy sessions    ASSESSMENT     Pt increased single leg balance activity with adding ball toss on balance board. Pt feels more fatigue in hips more than anything else. No sharp pains today. Still benefits from traction activity. Educated to continue hip and core strengthening at home to improve stability in hips    Clinton Is progressing well towards his goals.   Pt prognosis is Excellent.     Pt will continue to benefit from skilled outpatient physical therapy to address the deficits listed in the problem list box on initial evaluation, provide pt/family education and to maximize pt's level of independence in the home and community environment.     Pt's spiritual, cultural and educational needs considered and pt agreeable to plan of care and goals.     Anticipated Barriers for therapy: chronicity of pain, profession    Goals:  Short Term Goals (4 Weeks):  1. Patient will be compliant with home exercise program to supplement therapy in restoring pain free function.  Progressing  2. Patient will improve impaired lower extremity manual muscle tests to >/= 4+/5 to improve dynamic hip/knee support for functional tasks.  3. Patient will demonstrate improved squat and single leg stance positions to show improved functional  strengthening     Long Term Goals (8 Weeks):  1. Patient will improve FOTO score to </= 20% limited to decrease perceived limitation with mobility.   2. Patient will improve impaired lower extremity manual muscle tests to >/= 5/5 to improve dynamic hip/knee support for functional tasks.  3. Patient will demonstrate functional nonpainful squat and single leg stance to show functional improvement.   4. Patient will present with no hip pain during running activity to show functional improvement.      PLAN   Continue Plan of Care (POC) and progress per patient tolerance. See treatment section for details on planned progressions next session.  Plan of Care Due: 5/10/22      Kj Sutherland, PT

## 2022-04-18 NOTE — PROGRESS NOTES
"[facility]   Physical Therapy Treatment Note     Name: Clinton Combs  Clinic Number: [id number]    Therapy Diagnosis:   Encounter Diagnoses   Name Primary?    Acute pain of left knee Yes    Bilateral hip pain      Physician: Bashir Guerrero MD    Visit Date: 2022    Physician Orders: PT Eval and Treat   Medical Diagnosis from Referral:   M76.892 (ICD-10-CM) - Hamstring tendinitis of left thigh   S86.892D (ICD-10-CM) - Medial tibial stress syndrome, left, subsequent encounter      Evaluation Date: 3/10/2022  Authorization Period Expiration: 2022  Plan of Care Expiration: 5/10/2022  Visit # / Visits authorized:  (+eval)  FOTO: 1/3     Precautions: Standard    PTA Visit #: 0/5     Time In: 735 am  Time Out: 815 am  Total Billable Time: 40 minutes    SUBJECTIVE     Pt reports: did not run or workout previous day due to gym closure. Pt also is afraid of running due to left leg symptoms last time he ran the [employer].     He was compliant with home exercise program.  Response to previous treatment: no significant change.   Functional change: decreased frequency of pain.      Pain: 2/10  Location: L anterior hip and lower leg.      OBJECTIVE     Objective Measures updated at progress report unless specified.     Treatment     Clinton received the treatments listed below:        MANUAL THERAPY TECHNIQUES were applied for (10) minutes, includin/90 Lumbar Distraction: 10 minutes  Single leg distraction, left leg  sidelying rotational mobilizations, each side    THERAPEUTIC EXERCISES to develop strength, endurance, and ROM for (10) minutes including:    Upright Bike L4 5 mins  Hip flexor eccentric with stability ball 20x each leg  Prone UE and LE on stability ball 20x each 2# weights  Single leg bridges with BTB 20x each  Hamstring bridges, 12" box. 20x, 15# plate on waist  Hamstring roll outs with foam roller 12x  Quadruped hip EXT 5# ankle weight, 20x each leg  Single leg leg " press 5 plates 10x, 7 plates 2x5    NEUROMUSCULAR RE-EDUCATION to improve muscle activation/motor control for (20) minutes including:    Standing Clamshell: BTB 30x each leg   Prone plank with hip ext 2x10  SLS on bosu Pallof press  3 plates, 20x each way  Side plank with leg lift 20x each way  SLS ball toss, 3 ways. 2x8 each way with red ball.   Lateral squat walks BTB 2 laps  Monster walks BTB 2 laps  SL RDL, purple SC around lateral knee, 2x6 with 15# KB    Patient Education and Home Exercises     Home Exercises Provided and Patient Education Provided     Education provided:   - reviewed HEP for understanding.      Written Home Exercises Provided: Patient instructed to cont prior HEP. Exercises were reviewed and Clinton was able to demonstrate them prior to the end of the session.  Clinton demonstrated good  understanding of the education provided. See EMR under Patient Instructions for exercises provided during therapy sessions    ASSESSMENT     Pt presents with no increase in pain during session today. Will continue to run this week if weather remains good and report back with symptoms. Pt will continue to benefit from hip strengthening overall to help with pelvic stability during running activity.     Clinton Is progressing well towards his goals.   Pt prognosis is Excellent.     Pt will continue to benefit from skilled outpatient physical therapy to address the deficits listed in the problem list box on initial evaluation, provide pt/family education and to maximize pt's level of independence in the home and community environment.     Pt's spiritual, cultural and educational needs considered and pt agreeable to plan of care and goals.     Anticipated Barriers for therapy: chronicity of pain, profession    Goals:  Short Term Goals (4 Weeks):  1. Patient will be compliant with home exercise program to supplement therapy in restoring pain free function.  Progressing  2. Patient will improve impaired lower extremity  manual muscle tests to >/= 4+/5 to improve dynamic hip/knee support for functional tasks.  3. Patient will demonstrate improved squat and single leg stance positions to show improved functional strengthening     Long Term Goals (8 Weeks):  1. Patient will improve FOTO score to </= 20% limited to decrease perceived limitation with mobility.   2. Patient will improve impaired lower extremity manual muscle tests to >/= 5/5 to improve dynamic hip/knee support for functional tasks.  3. Patient will demonstrate functional nonpainful squat and single leg stance to show functional improvement.   4. Patient will present with no hip pain during running activity to show functional improvement.      PLAN   Continue Plan of Care (POC) and progress per patient tolerance. See treatment section for details on planned progressions next session.  Plan of Care Due: 5/10/22      Kj Sutherland PT

## 2022-04-21 ENCOUNTER — CLINICAL SUPPORT (OUTPATIENT)
Dept: REHABILITATION | Facility: HOSPITAL | Age: 32
End: 2022-04-21
Attending: STUDENT IN AN ORGANIZED HEALTH CARE EDUCATION/TRAINING PROGRAM
Payer: COMMERCIAL

## 2022-04-21 DIAGNOSIS — M25.552 BILATERAL HIP PAIN: ICD-10-CM

## 2022-04-21 DIAGNOSIS — M25.551 BILATERAL HIP PAIN: ICD-10-CM

## 2022-04-21 DIAGNOSIS — M25.562 ACUTE PAIN OF LEFT KNEE: Primary | ICD-10-CM

## 2022-04-21 PROCEDURE — 97112 NEUROMUSCULAR REEDUCATION: CPT | Performed by: PHYSICAL THERAPIST

## 2022-04-21 PROCEDURE — 97110 THERAPEUTIC EXERCISES: CPT | Performed by: PHYSICAL THERAPIST

## 2022-04-21 PROCEDURE — 97140 MANUAL THERAPY 1/> REGIONS: CPT | Performed by: PHYSICAL THERAPIST

## 2022-04-21 NOTE — PROGRESS NOTES
"[facility]   Physical Therapy Treatment Note     Name: Clinton Combs  [facility] Number: [id number]    Therapy Diagnosis:   Encounter Diagnoses   Name Primary?    Acute pain of left knee Yes    Bilateral hip pain      Physician: Bashir Guerrero MD    Visit Date: 2022    Physician Orders: PT Eval and Treat   Medical Diagnosis from Referral:   M76.892 (ICD-10-CM) - Hamstring tendinitis of left thigh   S86.892D (ICD-10-CM) - Medial tibial stress syndrome, left, subsequent encounter      Evaluation Date: 3/10/2022  Authorization Period Expiration: 2022  Plan of Care Expiration: 5/10/2022  Visit # / Visits authorized:  (+eval)  FOTO: 1/3     Precautions: Standard    PTA Visit #: 0/5     Time In: 705 am  Time Out: 750 am  Total Billable Time: 45 minutes    SUBJECTIVE     Pt reports: has not run lately, still has lower leg pain overall but not as bad as before. Pt still has fatigue in lower left knee if being active. Sitting at work still has discomfort.     He was compliant with home exercise program.  Response to previous treatment: no significant change.   Functional change: decreased frequency of pain.      Pain: 2/10  Location: L anterior hip and lower leg.      OBJECTIVE     Objective Measures updated at progress report unless specified.     Treatment     Clinton received the treatments listed below:        MANUAL THERAPY TECHNIQUES were applied for (10) minutes, includin/90 Lumbar Distraction: 10 minutes  Single leg distraction, left leg  sidelying rotational mobilizations, left side.     THERAPEUTIC EXERCISES to develop strength, endurance, and ROM for (15) minutes including:    Upright Bike L4 5 mins  Hip flexor eccentric with stability ball 20x each leg  Prone UE and LE on stability ball 20x each 2# weights  Single leg bridges with BTB 20x each  Hamstring bridges, 12" box. 20x, 15# plate on waist  Hamstring roll outs with foam roller 12x  Quadruped hip EXT 5# " ankle weight, 20x each leg  Single leg leg press 5 plates 10x, 7 plates 2x5    NEUROMUSCULAR RE-EDUCATION to improve muscle activation/motor control for (20) minutes including:    Standing Clamshell: BTB 30x each leg   Captain kellie on wall 10x each leg  Prone plank with hip ext 2x10  SLS on bosu Pallof press  3 plates, 20x each way  Side plank with leg lift 20x each way  SLS ball toss, 3 ways. 2x8 each way with red ball.   Lateral squat walks BTB 2 laps  Monster walks BTB 2 laps  SL RDL, purple SC around lateral knee, 3x8 with 15# KB    Patient Education and Home Exercises     Home Exercises Provided and Patient Education Provided     Education provided:   - reviewed HEP for understanding.      Written Home Exercises Provided: Patient instructed to cont prior HEP. Exercises were reviewed and Clinton was able to demonstrate them prior to the end of the session.  Clinton demonstrated good  understanding of the education provided. See EMR under Patient Instructions for exercises provided during therapy sessions    ASSESSMENT     Pt presents with discomfort with heavy hip strengthening. No significant increase in lower leg symptoms. Pt educated to run more over weekend and report back. Pt feels lower leg may be cause of symptoms due to lack of endurance.     Clinton Is progressing well towards his goals.   Pt prognosis is Excellent.     Pt will continue to benefit from skilled outpatient physical therapy to address the deficits listed in the problem list box on initial evaluation, provide pt/family education and to maximize pt's level of independence in the home and community environment.     Pt's spiritual, cultural and educational needs considered and pt agreeable to plan of care and goals.     Anticipated Barriers for therapy: chronicity of pain, profession    Goals:  Short Term Goals (4 Weeks):  1. Patient will be compliant with home exercise program to supplement therapy in restoring pain free function.   Progressing  2. Patient will improve impaired lower extremity manual muscle tests to >/= 4+/5 to improve dynamic hip/knee support for functional tasks.  3. Patient will demonstrate improved squat and single leg stance positions to show improved functional strengthening     Long Term Goals (8 Weeks):  1. Patient will improve FOTO score to </= 20% limited to decrease perceived limitation with mobility.   2. Patient will improve impaired lower extremity manual muscle tests to >/= 5/5 to improve dynamic hip/knee support for functional tasks.  3. Patient will demonstrate functional nonpainful squat and single leg stance to show functional improvement.   4. Patient will present with no hip pain during running activity to show functional improvement.      PLAN   Continue Plan of Care (POC) and progress per patient tolerance. See treatment section for details on planned progressions next session.  Plan of Care Due: 5/10/22      Kj Sutherland, PT

## 2022-04-25 ENCOUNTER — CLINICAL SUPPORT (OUTPATIENT)
Dept: REHABILITATION | Facility: HOSPITAL | Age: 32
End: 2022-04-25
Attending: STUDENT IN AN ORGANIZED HEALTH CARE EDUCATION/TRAINING PROGRAM
Payer: COMMERCIAL

## 2022-04-25 DIAGNOSIS — M25.552 BILATERAL HIP PAIN: ICD-10-CM

## 2022-04-25 DIAGNOSIS — M25.562 ACUTE PAIN OF LEFT KNEE: Primary | ICD-10-CM

## 2022-04-25 DIAGNOSIS — M25.551 BILATERAL HIP PAIN: ICD-10-CM

## 2022-04-25 PROCEDURE — 97110 THERAPEUTIC EXERCISES: CPT | Performed by: PHYSICAL THERAPIST

## 2022-04-25 PROCEDURE — 97112 NEUROMUSCULAR REEDUCATION: CPT | Performed by: PHYSICAL THERAPIST

## 2022-04-25 PROCEDURE — 97140 MANUAL THERAPY 1/> REGIONS: CPT | Performed by: PHYSICAL THERAPIST

## 2022-04-25 NOTE — PROGRESS NOTES
"[facility]   Physical Therapy Treatment Note     Name: Clinton Combs  [facility] Number: [id number]    Therapy Diagnosis:   Encounter Diagnoses   Name Primary?    Acute pain of left knee Yes    Bilateral hip pain      Physician: Bashir Guerrero MD    Visit Date: 4/25/2022    Physician Orders: PT Eval and Treat   Medical Diagnosis from Referral:   M76.892 (ICD-10-CM) - Hamstring tendinitis of left thigh   S86.892D (ICD-10-CM) - Medial tibial stress syndrome, left, subsequent encounter      Evaluation Date: 3/10/2022  Authorization Period Expiration: 12/31/2022  Plan of Care Expiration: 5/10/2022  Visit # / Visits authorized: 13/20 (+eval)  FOTO: 1/3     Precautions: Standard    PTA Visit #: 0/5     Time In: 0815 am  Time Out: 0900 am  Total Billable Time: 45 minutes    SUBJECTIVE     Pt reports: ran since previous visit with increase in symptoms after 1 mile.     He was compliant with home exercise program.  Response to previous treatment: no significant change.   Functional change: decreased frequency of pain.      Pain: 2/10  Location: L anterior hip and lower leg.      OBJECTIVE     Objective Measures updated at progress report unless specified.     Treatment     Clinton received the treatments listed below:        MANUAL THERAPY TECHNIQUES were applied for (10) minutes, including:  ASTYM to left ankle, shin, calf area  Long axis traction to LLE.     THERAPEUTIC EXERCISES to develop strength, endurance, and ROM for (10) minutes including:    Upright Bike L4 5 mins  Hip flexor eccentric with stability ball 20x each leg  Prone UE and LE on stability ball 20x each 2# weights  Single leg bridges with BTB 20x each  Hamstring bridges, 12" box. 20x, 15# plate on waist  Hamstring roll outs with foam roller 12x  Quadruped hip EXT 5# ankle weight, 20x each leg  Single leg leg press 5 plates 10x, 7 plates 2x5  Knee extension machine 2 plates, 25x      NEUROMUSCULAR RE-EDUCATION to improve muscle " activation/motor control for (25) minutes including:    Standing Clamshell: BTB 30x each leg   Prone plank with hip ext 2x10  SLS on bosu Pallof press  3 plates, 20x each way  Side plank with leg lift 20x each way  SLS ball toss, 3 ways. 2x8 each way with red ball.   Lateral squat walks BTB 2 laps  Monster walks BTB 2 laps  SL RDL, purple SC around lateral knee, 2x6 with 15# KB  MOBO board taps in standing, odds and evens 30x each  Standing clamshells on MOBO board 30x with RTB  Pallof press walkouts - RTB - 12 laps    Patient Education and Home Exercises     Home Exercises Provided and Patient Education Provided     Education provided:   - reviewed HEP for understanding.      Written Home Exercises Provided: Patient instructed to cont prior HEP. Exercises were reviewed and Clinton was able to demonstrate them prior to the end of the session.  Clinton demonstrated good  understanding of the education provided. See EMR under Patient Instructions for exercises provided during therapy sessions    ASSESSMENT     Pt participated in more ankle/calf stability and strengthening program. Increase in soreness overall but no increase in nerve pain. Pt will monitor focus on ankle/calf area during activity and reports back.     Clinton Is progressing well towards his goals.   Pt prognosis is Excellent.     Pt will continue to benefit from skilled outpatient physical therapy to address the deficits listed in the problem list box on initial evaluation, provide pt/family education and to maximize pt's level of independence in the home and community environment.     Pt's spiritual, cultural and educational needs considered and pt agreeable to plan of care and goals.     Anticipated Barriers for therapy: chronicity of pain, profession    Goals:  Short Term Goals (4 Weeks):  1. Patient will be compliant with home exercise program to supplement therapy in restoring pain free function.  Progressing  2. Patient will improve impaired lower  extremity manual muscle tests to >/= 4+/5 to improve dynamic hip/knee support for functional tasks.  3. Patient will demonstrate improved squat and single leg stance positions to show improved functional strengthening     Long Term Goals (8 Weeks):  1. Patient will improve FOTO score to </= 20% limited to decrease perceived limitation with mobility.   2. Patient will improve impaired lower extremity manual muscle tests to >/= 5/5 to improve dynamic hip/knee support for functional tasks.  3. Patient will demonstrate functional nonpainful squat and single leg stance to show functional improvement.   4. Patient will present with no hip pain during running activity to show functional improvement.      PLAN   Continue Plan of Care (POC) and progress per patient tolerance. See treatment section for details on planned progressions next session.  Plan of Care Due: 5/10/22      Kj Sutherland PT

## 2022-04-28 ENCOUNTER — CLINICAL SUPPORT (OUTPATIENT)
Dept: REHABILITATION | Facility: HOSPITAL | Age: 32
End: 2022-04-28
Attending: STUDENT IN AN ORGANIZED HEALTH CARE EDUCATION/TRAINING PROGRAM
Payer: COMMERCIAL

## 2022-04-28 DIAGNOSIS — M25.551 BILATERAL HIP PAIN: ICD-10-CM

## 2022-04-28 DIAGNOSIS — M25.552 BILATERAL HIP PAIN: ICD-10-CM

## 2022-04-28 DIAGNOSIS — M25.562 ACUTE PAIN OF LEFT KNEE: Primary | ICD-10-CM

## 2022-04-28 PROCEDURE — 97110 THERAPEUTIC EXERCISES: CPT | Performed by: PHYSICAL THERAPIST

## 2022-04-28 PROCEDURE — 97140 MANUAL THERAPY 1/> REGIONS: CPT | Performed by: PHYSICAL THERAPIST

## 2022-04-28 PROCEDURE — 97112 NEUROMUSCULAR REEDUCATION: CPT | Performed by: PHYSICAL THERAPIST

## 2022-04-28 NOTE — PROGRESS NOTES
"[facility]   Physical Therapy Treatment Note     Name: Clinton Combs  [facility] Number: [id number]    Therapy Diagnosis:   Encounter Diagnoses   Name Primary?    Acute pain of left knee Yes    Bilateral hip pain      Physician: Bashir Guerrero MD    Visit Date: 4/28/2022    Physician Orders: PT Eval and Treat   Medical Diagnosis from Referral:   M76.892 (ICD-10-CM) - Hamstring tendinitis of left thigh   S86.892D (ICD-10-CM) - Medial tibial stress syndrome, left, subsequent encounter      Evaluation Date: 3/10/2022  Authorization Period Expiration: 12/31/2022  Plan of Care Expiration: 5/10/2022  Visit # / Visits authorized: 14/20 (+eval)  FOTO: 1/3     Precautions: Standard    PTA Visit #: 0/5     Time In: 0705 am  Time Out: 0745 am  Total Billable Time: 40 minutes    SUBJECTIVE     Pt reports: feels a good soreness in left ankle/calf area    He was compliant with home exercise program.  Response to previous treatment: no significant change.   Functional change: decreased frequency of pain.      Pain: 2/10  Location: L anterior hip and lower leg.      OBJECTIVE     Objective Measures updated at progress report unless specified.     Treatment     Clinton received the treatments listed below:        MANUAL THERAPY TECHNIQUES were applied for (10) minutes, including:    ASTYM to left ankle, shin, calf area    THERAPEUTIC EXERCISES to develop strength, endurance, and ROM for (10) minutes including:    Upright Bike L4 5 mins  Hip flexor eccentric with stability ball 20x each leg  Prone UE and LE on stability ball 20x each 2# weights  Single leg bridges with BTB 20x each  Hamstring bridges, 12" box. 20x, 15# plate on waist  Hamstring roll outs with foam roller 12x  Quadruped hip EXT 5# ankle weight, 20x each leg  Single leg leg press 5 plates 10x, 7 plates 2x5  Knee extension machine 2 plates, 25x      NEUROMUSCULAR RE-EDUCATION to improve muscle activation/motor control for (25) minutes " including:    Standing Clamshell: BTB 30x each leg   Prone plank with hip ext 2x10  Side plank with leg lift 20x each way  SLS ball toss, 3 ways. 2x8 each way with red ball.   Lateral squat walks BTB 2 laps  Monster walks BTB 2 laps  SL RDL, purple SC around lateral knee, 2x6 with 15# KB  MOBO board taps in standing, odds and evens 30x each  Standing clamshells on MOBO board 30x with RTB  Pallof press walkouts - RTB - 12 laps    Patient Education and Home Exercises     Home Exercises Provided and Patient Education Provided     Education provided:   - reviewed HEP for understanding.      Written Home Exercises Provided: Patient instructed to cont prior HEP. Exercises were reviewed and Clinton was able to demonstrate them prior to the end of the session.  Clinton demonstrated good  understanding of the education provided. See EMR under Patient Instructions for exercises provided during therapy sessions    ASSESSMENT     Pt felt somewhat improved symptoms following previous visit. No significant increase in pain today. Pt treatment focus more on ankle/foot area. Pt will run over weekend and get a gauge on fatigue vs. Pain in lower leg.     Clinton Is progressing well towards his goals.   Pt prognosis is Excellent.     Pt will continue to benefit from skilled outpatient physical therapy to address the deficits listed in the problem list box on initial evaluation, provide pt/family education and to maximize pt's level of independence in the home and community environment.     Pt's spiritual, cultural and educational needs considered and pt agreeable to plan of care and goals.     Anticipated Barriers for therapy: chronicity of pain, profession    Goals:  Short Term Goals (4 Weeks):  1. Patient will be compliant with home exercise program to supplement therapy in restoring pain free function.  Progressing  2. Patient will improve impaired lower extremity manual muscle tests to >/= 4+/5 to improve dynamic hip/knee support for  functional tasks.  3. Patient will demonstrate improved squat and single leg stance positions to show improved functional strengthening     Long Term Goals (8 Weeks):  1. Patient will improve FOTO score to </= 20% limited to decrease perceived limitation with mobility.   2. Patient will improve impaired lower extremity manual muscle tests to >/= 5/5 to improve dynamic hip/knee support for functional tasks.  3. Patient will demonstrate functional nonpainful squat and single leg stance to show functional improvement.   4. Patient will present with no hip pain during running activity to show functional improvement.      PLAN   Continue Plan of Care (POC) and progress per patient tolerance. See treatment section for details on planned progressions next session.  Plan of Care Due: 5/10/22      Kj Sutherland PT

## 2022-05-02 ENCOUNTER — CLINICAL SUPPORT (OUTPATIENT)
Dept: REHABILITATION | Facility: HOSPITAL | Age: 32
End: 2022-05-02
Payer: COMMERCIAL

## 2022-05-02 DIAGNOSIS — M25.552 BILATERAL HIP PAIN: ICD-10-CM

## 2022-05-02 DIAGNOSIS — M25.562 ACUTE PAIN OF LEFT KNEE: Primary | ICD-10-CM

## 2022-05-02 DIAGNOSIS — M25.551 BILATERAL HIP PAIN: ICD-10-CM

## 2022-05-02 PROCEDURE — 97140 MANUAL THERAPY 1/> REGIONS: CPT | Performed by: PHYSICAL THERAPIST

## 2022-05-02 PROCEDURE — 97112 NEUROMUSCULAR REEDUCATION: CPT | Performed by: PHYSICAL THERAPIST

## 2022-05-02 NOTE — PROGRESS NOTES
"[facility]   Physical Therapy Treatment Note     Name: Clinton Combs  Clinic Number: [id number]    Therapy Diagnosis:   Encounter Diagnoses   Name Primary?    Acute pain of left knee Yes    Bilateral hip pain      Physician: Bashir Guerrero MD    Visit Date: 5/2/2022    Physician Orders: PT Eval and Treat   Medical Diagnosis from Referral:   M76.892 (ICD-10-CM) - Hamstring tendinitis of left thigh   S86.892D (ICD-10-CM) - Medial tibial stress syndrome, left, subsequent encounter      Evaluation Date: 3/10/2022  Authorization Period Expiration: 12/31/2022  Plan of Care Expiration: 5/10/2022  Visit # / Visits authorized: 15/20 (+eval)  FOTO: 2/3     Precautions: Standard    PTA Visit #: 0/5     Time In: 0735 am  Time Out: 0820 am  Total Billable Time: 45 minutes    SUBJECTIVE     Pt reports: did run other day, symptoms were similar. Main discomfort came when he stopped running and started walking.     He was compliant with home exercise program.  Response to previous treatment: no significant change.   Functional change: decreased frequency of pain.      Pain: 2/10  Location: L anterior hip and lower leg.      OBJECTIVE     Objective Measures updated at progress report unless specified.     Treatment     Clinton received the treatments listed below:        MANUAL THERAPY TECHNIQUES were applied for (15) minutes, including:    ASTYM to left ankle, shin, calf area  Long axis traction LLE    THERAPEUTIC EXERCISES to develop strength, endurance, and ROM for (0) minutes including:    Upright Bike L4 5 mins  Hip flexor eccentric with stability ball 20x each leg  Prone UE and LE on stability ball 20x each 2# weights  Single leg bridges with BTB 20x each  Hamstring bridges, 12" box. 20x, 15# plate on waist  Hamstring roll outs with foam roller 12x  Quadruped hip EXT 5# ankle weight, 20x each leg  Single leg leg press 5 plates 10x, 7 plates 2x5  Knee extension machine 2 plates, " "25x      NEUROMUSCULAR RE-EDUCATION to improve muscle activation/motor control for (30) minutes including:    Prone plank with hip ext 2x10  Side plank with leg lift 20x each way  SLS ball toss, 3 ways. 2x8 each way with red ball.   Lateral squat walks BTB 2 laps  Monster walks BTB 2 laps  SL RDL, purple SC around lateral knee, 2x6 with 15# KB  3 way step downs 4" box, 15# KB. 3x8  MOBO board taps in standing, odds and evens 30x each  MOBO RDL's, LLE, 10#KB. 3x6  Standing clamshells on MOBO board 30x with GTB  MOBO balance with KB around waist: 7.5# KB. 2x10  MOBO balance with KB shoulder press 7.5#KB 2x10  Pallof press walkouts - RTB - 12 laps    Patient Education and Home Exercises     Home Exercises Provided and Patient Education Provided     Education provided:   - reviewed HEP for understanding.      Written Home Exercises Provided: Patient instructed to cont prior HEP. Exercises were reviewed and Clinton was able to demonstrate them prior to the end of the session.  Clinton demonstrated good  understanding of the education provided. See EMR under Patient Instructions for exercises provided during therapy sessions    ASSESSMENT     Pt tolerated session with increase in fatigue along ankle and knee on left leg. No further increase in pains overall during session. Pt educated to continue to strengthen hips and calves to improve running mechanics.     Clinton Is progressing well towards his goals.   Pt prognosis is Excellent.     Pt will continue to benefit from skilled outpatient physical therapy to address the deficits listed in the problem list box on initial evaluation, provide pt/family education and to maximize pt's level of independence in the home and community environment.     Pt's spiritual, cultural and educational needs considered and pt agreeable to plan of care and goals.     Anticipated Barriers for therapy: chronicity of pain, profession    Goals:  Short Term Goals (4 Weeks):  1. Patient will be " compliant with home exercise program to supplement therapy in restoring pain free function.  Progressing  2. Patient will improve impaired lower extremity manual muscle tests to >/= 4+/5 to improve dynamic hip/knee support for functional tasks.  3. Patient will demonstrate improved squat and single leg stance positions to show improved functional strengthening     Long Term Goals (8 Weeks):  1. Patient will improve FOTO score to </= 20% limited to decrease perceived limitation with mobility.   2. Patient will improve impaired lower extremity manual muscle tests to >/= 5/5 to improve dynamic hip/knee support for functional tasks.  3. Patient will demonstrate functional nonpainful squat and single leg stance to show functional improvement.   4. Patient will present with no hip pain during running activity to show functional improvement.      PLAN   Continue Plan of Care (POC) and progress per patient tolerance. See treatment section for details on planned progressions next session.  Plan of Care Due: 5/10/22      Kj Sutherladn, PT

## 2022-05-09 ENCOUNTER — CLINICAL SUPPORT (OUTPATIENT)
Dept: REHABILITATION | Facility: HOSPITAL | Age: 32
End: 2022-05-09
Payer: COMMERCIAL

## 2022-05-09 DIAGNOSIS — M25.562 ACUTE PAIN OF LEFT KNEE: Primary | ICD-10-CM

## 2022-05-09 DIAGNOSIS — M25.551 BILATERAL HIP PAIN: ICD-10-CM

## 2022-05-09 DIAGNOSIS — M25.552 BILATERAL HIP PAIN: ICD-10-CM

## 2022-05-09 PROCEDURE — 97110 THERAPEUTIC EXERCISES: CPT | Performed by: PHYSICAL THERAPIST

## 2022-05-09 PROCEDURE — 97140 MANUAL THERAPY 1/> REGIONS: CPT | Performed by: PHYSICAL THERAPIST

## 2022-05-09 NOTE — PROGRESS NOTES
[facility]   Physical Therapy Treatment Note     Name: Clinton Combs  [facility] Number: [id number]    Therapy Diagnosis:   Encounter Diagnoses   Name Primary?    Acute pain of left knee Yes    Bilateral hip pain      Physician: Bashir Guerrero MD    Visit Date: 5/9/2022    Physician Orders: PT Eval and Treat   Medical Diagnosis from Referral:   M76.892 (ICD-10-CM) - Hamstring tendinitis of left thigh   S86.892D (ICD-10-CM) - Medial tibial stress syndrome, left, subsequent encounter      Evaluation Date: 3/10/2022  Authorization Period Expiration: 12/31/2022  Plan of Care Expiration: 5/10/2022  Visit # / Visits authorized: 16/20 (+eval)  FOTO: 2/3     Precautions: Standard    PTA Visit #: 0/5     Time In: 0735 am  Time Out: 0824 am  Total Billable Time: 48 minutes    SUBJECTIVE     Pt reports: left knee pain immediately, somewhat sharp, but pushed through during run. Ran 3 miles yesterday. After 1 mile had minor symptoms. When stopping to walk after 3 miles had symptoms but not as bad as before. Feels he had swelling in fingers and feet following run. Does have left calf numbness when symptoms get real bad.     He was compliant with home exercise program.  Response to previous treatment: no significant change.   Functional change: decreased frequency of pain.      Pain: 2/10  Location: L anterior hip and lower leg.      OBJECTIVE     Objective Measures updated at progress report unless specified.     Range of Motion/Strength:   Thoracolumbar AROM Pain/Dysfunction with Movement   Flexion Full     Extension Full     Right side bending Full     Left side bending Full     Right rotation Full Quadrant testing: minimal discomfort   Left rotation Full Quadrant testing: minimal discomfort      Hip Right Left Pain/Dysfunction with Movement   AROM         flexion 110 110    extension 15 20     abduction 45 45     Internal rotation 30 25    External rotation 55 55        Knee Right Left  "Pain/Dysfunction with Movement   AROM/PROM Full Full        Ankle Right Left Pain/Dysfunction with Movement   AROM/PROM Full Full        L/E MMT Right Left Pain/Dysfunction with Movement   Hip Flexion 4+/5 4+/5     Hip Extension 4+/5 4/5     Hip Abduction 4+/5 4/5     Hip Adduction 4+/5 4/5     Hip IR 4+/5 4+/5     Hip ER 4+/5 4+/5     Knee Flexion 4+/5 4+/5     Knee Extension 4+/5 4+/5     Ankle DF 5/5 5/5     Ankle PF 5/5 5/5     Ankle Inversion 5/5 5/5     Ankle Eversion 5/5 5/5     Big Toe Extension 5/5 5/5           Selective Functional Movement Assessment (SFMA) FN: functional, nonpainful. FP: functional, painful. DP: dysfunctional, painful. DN: dysfunctional, nonpainful.   SLS R: FN- able to stabilize  L: FN:  - able to stabilize   overhead deep squat FN: no shifting or pain           CMS Impairment/Limitation/Restriction for FOTO Knee Survey     Therapist reviewed FOTO scores for Clinton Combs on 5/2/2022.   FOTO documents entered into [employer] - see Media section.     Limitation Score: 39%         Treatment     Clinton received the treatments listed below:        MANUAL THERAPY TECHNIQUES were applied for (8) minutes, including:    ASTYM to left ankle, shin, calf area  Long axis traction LLE    THERAPEUTIC EXERCISES to develop strength, endurance, and ROM for (15) minutes including assessment:    Upright Bike L4 5 mins  Standing soleus stretch 30" x 3  Hip flexor eccentric with stability ball 20x each leg  Prone UE and LE on stability ball 20x each 2# weights  Single leg bridges with BTB 20x each  Hamstring bridges, 12" box. 20x, 15# plate on waist  Hamstring roll outs with foam roller 12x  Quadruped hip EXT 5# ankle weight, 20x each leg  Single leg leg press 5 plates 10x, 7 plates 2x5  Knee extension machine 2 plates, 25x      NEUROMUSCULAR RE-EDUCATION to improve muscle activation/motor control for (25) minutes including:    Prone plank with hip ext 2x10  Side plank with leg lift 20x each way  SLS ball toss, " "3 ways. 2x8 each way with red ball.   Lateral squat walks BTB 2 laps  Monster walks BTB 2 laps  SL RDL, 3 cone. 10x each leg on 2' box with 10#KB  step downs 6" box, 10# KB. 3x8  MOBO board taps in standing, odds and evens 30x each  MOBO RDL's, LLE, 10#KB. 3x6  Standing clamshells on MOBO board 30x with GTB  MOBO balance with KB around waist: 7.5# KB. 2x10  MOBO balance with KB shoulder press 7.5#KB 2x10  Pallof press walkouts - RTB - 12 laps    Patient Education and Home Exercises     Home Exercises Provided and Patient Education Provided     Education provided:   - reviewed HEP for understanding.      Written Home Exercises Provided: Patient instructed to cont prior HEP. Exercises were reviewed and Clinton was able to demonstrate them prior to the end of the session.  Clinton demonstrated good  understanding of the education provided. See EMR under Patient Instructions for exercises provided during therapy sessions    ASSESSMENT     Clinton tolerated session well with no significant increase in pain. Pt explains his HR goes up over 200 bpm when running and notices some swelling in hands and feet overall. Pt educated to seek further opinions on that statistic specifically. Pt will focus on home hip and core strengthening along with left foot/ankle strengthening to increase endurance during running and decrease symptoms overall.     Clinton Is progressing well towards his goals.   Pt prognosis is Excellent.     Pt will continue to benefit from skilled outpatient physical therapy to address the deficits listed in the problem list box on initial evaluation, provide pt/family education and to maximize pt's level of independence in the home and community environment.     Pt's spiritual, cultural and educational needs considered and pt agreeable to plan of care and goals.     Anticipated Barriers for therapy: chronicity of pain, profession    Goals:  Short Term Goals (4 Weeks):  1. Patient will be compliant with home " exercise program to supplement therapy in restoring pain free function.  MET  2. Patient will improve impaired lower extremity manual muscle tests to >/= 4+/5 to improve dynamic hip/knee support for functional tasks. MET  3. Patient will demonstrate improved squat and single leg stance positions to show improved functional strengthening MET     Long Term Goals (8 Weeks):  1. Patient will improve FOTO score to </= 20% limited to decrease perceived limitation with mobility. Not met  2. Patient will improve impaired lower extremity manual muscle tests to >/= 5/5 to improve dynamic hip/knee support for functional tasks. Not met  3. Patient will demonstrate functional nonpainful squat and single leg stance to show functional improvement. MET  4. Patient will present with no hip pain during running activity to show functional improvement.   Not met    PLAN   Discharge today    Kj Sutherland, PT

## 2022-05-09 NOTE — PLAN OF CARE
[facility]  Physical Therapy Discharge Note    Name: Clinton Combs  [facility] Number: [id number]    Therapy Diagnosis:   Encounter Diagnoses   Name Primary?    Acute pain of left knee Yes    Bilateral hip pain      Physician: Bashir Guerrero MD    Physician Orders: PT Eval and Treat   Medical Diagnosis from Referral:   M76.892 (ICD-10-CM) - Hamstring tendinitis of left thigh   S86.892D (ICD-10-CM) - Medial tibial stress syndrome, left, subsequent encounter      Evaluation Date: 3/10/2022      Date of Last visit: 5/9/2022  Total Visits Received: 17    ASSESSMENT      Range of Motion/Strength:   Thoracolumbar AROM Pain/Dysfunction with Movement   Flexion Full     Extension Full     Right side bending Full     Left side bending Full     Right rotation Full Quadrant testing: minimal discomfort   Left rotation Full Quadrant testing: minimal discomfort      Hip Right Left Pain/Dysfunction with Movement   AROM         flexion 110 110     extension 15 20     abduction 45 45     Internal rotation 30 25     External rotation 55 55        Knee Right Left Pain/Dysfunction with Movement   AROM/PROM Full Full        Ankle Right Left Pain/Dysfunction with Movement   AROM/PROM Full Full        L/E MMT Right Left Pain/Dysfunction with Movement   Hip Flexion 4+/5 4+/5     Hip Extension 4+/5 4/5     Hip Abduction 4+/5 4/5     Hip Adduction 4+/5 4/5     Hip IR 4+/5 4+/5     Hip ER 4+/5 4+/5     Knee Flexion 4+/5 4+/5     Knee Extension 4+/5 4+/5     Ankle DF 5/5 5/5     Ankle PF 5/5 5/5     Ankle Inversion 5/5 5/5     Ankle Eversion 5/5 5/5     Big Toe Extension 5/5 5/5           Selective Functional Movement Assessment (SFMA) FN: functional, nonpainful. FP: functional, painful. DP: dysfunctional, painful. DN: dysfunctional, nonpainful.   SLS R: FN- able to stabilize  L: FN:  - able to stabilize   overhead deep squat FN: no shifting or pain            CMS Impairment/Limitation/Restriction for  FOTO Knee Survey     Therapist reviewed FOTO scores for Clinton Combs on 5/2/2022.   FOTO documents entered into [facility] - see Media section.     Limitation Score: 39%       Clinton tolerated session well with no significant increase in pain. Pt explains his HR goes up over 200 bpm when running and notices some swelling in hands and feet overall. Pt educated to seek further opinions on that statistic specifically. Pt will focus on home hip and core strengthening along with left foot/ankle strengthening to increase endurance during running and decrease symptoms overall.     Discharge reason: Patient requested discharge    Discharge FOTO Score: 39% limited    Goals:  Short Term Goals (4 Weeks):  1. Patient will be compliant with home exercise program to supplement therapy in restoring pain free function.  MET  2. Patient will improve impaired lower extremity manual muscle tests to >/= 4+/5 to improve dynamic hip/knee support for functional tasks. MET  3. Patient will demonstrate improved squat and single leg stance positions to show improved functional strengthening MET     Long Term Goals (8 Weeks):  1. Patient will improve FOTO score to </= 20% limited to decrease perceived limitation with mobility. Not met  2. Patient will improve impaired lower extremity manual muscle tests to >/= 5/5 to improve dynamic hip/knee support for functional tasks. Not met  3. Patient will demonstrate functional nonpainful squat and single leg stance to show functional improvement. MET  4. Patient will present with no hip pain during running activity to show functional improvement.   Not met       PLAN   This patient is discharged from Physical Therapy      Kj Sutherland, PT

## 2022-07-25 ENCOUNTER — OFFICE VISIT (OUTPATIENT)
Dept: SPORTS MEDICINE | Facility: CLINIC | Age: 32
End: 2022-07-25
Payer: COMMERCIAL

## 2022-07-25 ENCOUNTER — PATIENT MESSAGE (OUTPATIENT)
Dept: SPORTS MEDICINE | Facility: CLINIC | Age: 32
End: 2022-07-25

## 2022-07-25 VITALS — HEIGHT: 70 IN | WEIGHT: 160.06 LBS | RESPIRATION RATE: 20 BRPM | BODY MASS INDEX: 22.91 KG/M2

## 2022-07-25 DIAGNOSIS — M25.562 ACUTE PAIN OF LEFT KNEE: ICD-10-CM

## 2022-07-25 DIAGNOSIS — S86.892D MEDIAL TIBIAL STRESS SYNDROME, LEFT, SUBSEQUENT ENCOUNTER: ICD-10-CM

## 2022-07-25 DIAGNOSIS — M23.301 MENISCUS, LATERAL, DERANGEMENT, LEFT: Primary | ICD-10-CM

## 2022-07-25 PROCEDURE — 3008F PR BODY MASS INDEX (BMI) DOCUMENTED: ICD-10-PCS | Mod: CPTII,S$GLB,, | Performed by: STUDENT IN AN ORGANIZED HEALTH CARE EDUCATION/TRAINING PROGRAM

## 2022-07-25 PROCEDURE — 99214 PR OFFICE/OUTPT VISIT, EST, LEVL IV, 30-39 MIN: ICD-10-PCS | Mod: S$GLB,,, | Performed by: STUDENT IN AN ORGANIZED HEALTH CARE EDUCATION/TRAINING PROGRAM

## 2022-07-25 PROCEDURE — 3008F BODY MASS INDEX DOCD: CPT | Mod: CPTII,S$GLB,, | Performed by: STUDENT IN AN ORGANIZED HEALTH CARE EDUCATION/TRAINING PROGRAM

## 2022-07-25 PROCEDURE — 1159F MED LIST DOCD IN RCRD: CPT | Mod: CPTII,S$GLB,, | Performed by: STUDENT IN AN ORGANIZED HEALTH CARE EDUCATION/TRAINING PROGRAM

## 2022-07-25 PROCEDURE — 99214 OFFICE O/P EST MOD 30 MIN: CPT | Mod: S$GLB,,, | Performed by: STUDENT IN AN ORGANIZED HEALTH CARE EDUCATION/TRAINING PROGRAM

## 2022-07-25 PROCEDURE — 99999 PR PBB SHADOW E&M-EST. PATIENT-LVL III: ICD-10-PCS | Mod: PBBFAC,,, | Performed by: STUDENT IN AN ORGANIZED HEALTH CARE EDUCATION/TRAINING PROGRAM

## 2022-07-25 PROCEDURE — 99999 PR PBB SHADOW E&M-EST. PATIENT-LVL III: CPT | Mod: PBBFAC,,, | Performed by: STUDENT IN AN ORGANIZED HEALTH CARE EDUCATION/TRAINING PROGRAM

## 2022-07-25 PROCEDURE — 1159F PR MEDICATION LIST DOCUMENTED IN MEDICAL RECORD: ICD-10-PCS | Mod: CPTII,S$GLB,, | Performed by: STUDENT IN AN ORGANIZED HEALTH CARE EDUCATION/TRAINING PROGRAM

## 2022-07-25 NOTE — PROGRESS NOTES
Patient ID: Clinton Combs  YOB: 1990  MRN: [id number]    Chief Complaint: Pain of the Left Lower Leg and Pain of the Left Knee      History of Present Illness: Clinton Combs is a right-hand dominant 32 y.o. male who presents today with left knee and lower leg pain.     The patient is active in running, [employer].  Occupation: [employer]     Clinton Combs states that the Pain of the Left Lower Leg and Pain of the Left Knee is Chronic. This began 10 months ago there was not a specific injury. Clinton Combs describes the pain as a continuous. Describes this as an aching or throbbing that will worsen at time. Current pain level at rest as 2/10, pain level at worst as 5/10 and pain level at best as 0/10 (Numeric Pain Rating Scale). Associated symptoms include: Swelling No, Instability No, Night pain No, Mechanical occossional Yes, locking/catching No. Aggravating activities include occasionally running, sitting, throughout the day. Alleviating symptoms include rest.     Clinton Combs currently is not taking medication for this. They admits to formal physical therapy for this. Last physical therapy ended about one-two month ago and believes that are marginally better following this.Clinton Combs self reports a 70% percent of function today.       Past Medical History:   Past Medical History:   Diagnosis Date    ELE positive      Past Surgical History:   Procedure Laterality Date    back injections       Family History   Problem Relation Age of Onset    Diabetes Father         prediabetes     Alzheimer's disease Maternal Grandmother     Heart attack Maternal Grandfather      Social History     Socioeconomic History    Marital status: Single    Number of children: 0   Occupational History     Comment:     Tobacco Use    Smoking status: Never Smoker    Smokeless tobacco: Never Used   Substance and Sexual Activity    Alcohol use: Yes    Drug use: Never     Sexual activity: Not Currently     Medication List with Changes/Refills   Current Medications    DULOXETINE (CYMBALTA) 30 MG CAPSULE    Take 1 capsule (30 mg total) by mouth once daily.    HYDROXYZINE HCL (ATARAX) 10 MG TAB    Take 1/2 tablet ( 5 mg) to 1 tablet (10 mg) twice a day, as needed for anxiety or panic attacks    LISDEXAMFETAMINE (VYVANSE) 10 MG CAP    Take 10 mg by mouth once daily.    MELOXICAM (MOBIC) 7.5 MG TABLET    Take 1 tablet (7.5 mg total) by mouth once daily.     Review of patient's allergies indicates:  No Known Allergies    Physical Exam:   Body mass index is 22.97 kg/m².    GENERAL: Well appearing, in no acute distress.  HEAD: Normocephalic and atraumatic.  ENT: External ears and nose grossly normal.  EYES: EOMI bilaterally  PULMONARY: Respirations are grossly even and non-labored.  NEURO: Awake, alert, and oriented x 3.  SKIN: No obvious rashes appreciated.  PSYCH: Mood & affect are appropriate.    Detailed MSK exam:     Tenderness over the left lateral joint line no large effusion no large crepitance appreciated with active range of motion    Imaging:  Narrative & Impression  EXAMINATION:  XR KNEE ORTHO BILAT WITH FLEXION     CLINICAL HISTORY:  Pain in right knee     TECHNIQUE:  AP standing of both knees, PA flexion standing views of both knees, and Merchant views of both knees were performed.  Lateral views of both knees were also performed.     COMPARISON:  None     FINDINGS:  Joint spaces are maintained.  No fracture.  No dislocation.  No effusion.  Soft tissues are normal.     Impression:     As above        Electronically signed by: Steve Ann MD  Date:                                            09/28/2021  Time:                                           09:43    Relevant imaging results were reviewed and interpreted by me and per my read as above.  This was discussed with the patient and / or family today.     Assessment:  Clinton Combs is a 32 y.o. male presents today for  persistent left lateral knee pain.  Has done at least 6-8 weeks of formal physical therapy continues to have pain throughout day as well as the end of the day.  He has had multiple other conservative treatments including anti-inflammatories rest and bracing without any improvements.  Concern for possible lateral joint line cartilage injury verses meniscal injury.  MRI for further evaluation.  Follow-up with me after MRI.    I spent a total of 30 minutes on the day of the visit.  This includes face to face time and non-face to face time preparing to see the patient (eg, review of tests), obtaining and/or reviewing separately obtained history, documenting clinical information in the electronic or other health record, independently interpreting results and communicating results to the patient/family/caregiver, or care coordinator.      Meniscus, lateral, derangement, left  -     MRI Knee Without Contrast Left; Future; Expected date: 07/25/2022    Acute pain of left knee    Medial tibial stress syndrome, left, subsequent encounter           Bashir Guerrero MD    Disclaimer: This note was prepared using a voice recognition system and is likely to have sound alike errors within the text.

## 2022-07-25 NOTE — PATIENT INSTRUCTIONS
Assessment:  Clinton Combs is a 32 y.o. male   Chief Complaint   Patient presents with    Left Lower Leg - Pain    Left Knee - Pain       Encounter Diagnosis   Name Primary?    Meniscus, lateral, derangement, left Yes        Plan:  Placed a referral for an MRI within the system today. We will follow-up in clinic 24-48 hours after these images are obtained.  Continue with home exercise program as prescribed by PT.  Please reach out to us through [employer] messages if you have any questions or concerns. We will gladly answer you in a timely manner.     Follow-up: After MRI results or sooner if there are any problems between now and then.    Thank you for choosing [facility] and Dr. Bashir Guerrero for your orthopedic & sports medicine care. It is our goal to provide you with exceptional care that will help keep you healthy, active, and get you back in the game.    Please do not hesitate to reach out to us via email, phone, or [employer] with any questions, concerns, or feedback.    If you felt that you received exemplary care today, please consider leaving us feedback on [employer] at:  [email or web address]    If you are experiencing pain/discomfort ,or have questions after 5pm and would like to be connected to the [facility]gu[facility] on-call team, please call this number and specify which Sports Medicine provider is treating you: [phone]

## 2022-08-10 ENCOUNTER — HOSPITAL ENCOUNTER (OUTPATIENT)
Dept: RADIOLOGY | Facility: HOSPITAL | Age: 32
Discharge: HOME | End: 2022-08-10
Attending: STUDENT IN AN ORGANIZED HEALTH CARE EDUCATION/TRAINING PROGRAM
Payer: COMMERCIAL

## 2022-08-10 DIAGNOSIS — M23.301 MENISCUS, LATERAL, DERANGEMENT, LEFT: ICD-10-CM

## 2022-08-10 PROCEDURE — 73721 MRI KNEE WITHOUT CONTRAST LEFT: ICD-10-PCS | Mod: 26,LT,, | Performed by: RADIOLOGY

## 2022-08-10 PROCEDURE — 73721 MRI JNT OF LWR EXTRE W/O DYE: CPT | Mod: TC,LT

## 2022-08-10 PROCEDURE — 73721 MRI JNT OF LWR EXTRE W/O DYE: CPT | Mod: 26,LT,, | Performed by: RADIOLOGY

## 2022-08-16 LAB
CHOL/HDLC RATIO: 2.4
CHOLESTEROL, TOTAL: 191 MG/DL (ref 100–200)
HDL CHOLESTEROL: 81 MG/DL
LDLC SERPL CALC-MCNC: 96 MG/DL
TRIGL SERPL-MCNC: 66 MG/DL
VLDLC SERPL-MCNC: 13 MG/DL (ref 5–40)

## 2022-08-23 ENCOUNTER — OFFICE VISIT (OUTPATIENT)
Dept: ORTHOPEDICS | Facility: CLINIC | Age: 32
End: 2022-08-23
Payer: COMMERCIAL

## 2022-08-23 VITALS — RESPIRATION RATE: 20 BRPM | BODY MASS INDEX: 23.64 KG/M2 | HEIGHT: 70 IN | WEIGHT: 165.13 LBS

## 2022-08-23 DIAGNOSIS — M76.892 HAMSTRING TENDINITIS OF LEFT THIGH: Primary | ICD-10-CM

## 2022-08-23 PROCEDURE — 1159F PR MEDICATION LIST DOCUMENTED IN MEDICAL RECORD: ICD-10-PCS | Mod: CPTII,S$GLB,, | Performed by: STUDENT IN AN ORGANIZED HEALTH CARE EDUCATION/TRAINING PROGRAM

## 2022-08-23 PROCEDURE — 3008F BODY MASS INDEX DOCD: CPT | Mod: CPTII,S$GLB,, | Performed by: STUDENT IN AN ORGANIZED HEALTH CARE EDUCATION/TRAINING PROGRAM

## 2022-08-23 PROCEDURE — 99213 OFFICE O/P EST LOW 20 MIN: CPT | Mod: S$GLB,,, | Performed by: STUDENT IN AN ORGANIZED HEALTH CARE EDUCATION/TRAINING PROGRAM

## 2022-08-23 PROCEDURE — 99213 PR OFFICE/OUTPT VISIT, EST, LEVL III, 20-29 MIN: ICD-10-PCS | Mod: S$GLB,,, | Performed by: STUDENT IN AN ORGANIZED HEALTH CARE EDUCATION/TRAINING PROGRAM

## 2022-08-23 PROCEDURE — 1159F MED LIST DOCD IN RCRD: CPT | Mod: CPTII,S$GLB,, | Performed by: STUDENT IN AN ORGANIZED HEALTH CARE EDUCATION/TRAINING PROGRAM

## 2022-08-23 PROCEDURE — 99999 PR PBB SHADOW E&M-EST. PATIENT-LVL III: ICD-10-PCS | Mod: PBBFAC,,, | Performed by: STUDENT IN AN ORGANIZED HEALTH CARE EDUCATION/TRAINING PROGRAM

## 2022-08-23 PROCEDURE — 99999 PR PBB SHADOW E&M-EST. PATIENT-LVL III: CPT | Mod: PBBFAC,,, | Performed by: STUDENT IN AN ORGANIZED HEALTH CARE EDUCATION/TRAINING PROGRAM

## 2022-08-23 PROCEDURE — 3008F PR BODY MASS INDEX (BMI) DOCUMENTED: ICD-10-PCS | Mod: CPTII,S$GLB,, | Performed by: STUDENT IN AN ORGANIZED HEALTH CARE EDUCATION/TRAINING PROGRAM

## 2022-08-23 NOTE — PROGRESS NOTES
Patient ID: Clinton Combs  YOB: 1990  MRN: [id number]    Chief Complaint: Follow-up (Meniscus, lateral, derangement, left discuss MRI findings )      History of Present Illness: Clinton Combs is a right-hand dominant 32 y.o. male who presents today with 2/10 c/o Meniscus, lateral, derangement, left and discuss MRI findings.     The patient is active in none.  Occupation:      32-year-old male presenting today for MRI follow-up left knee.  He has returned to running about twice a week 70 miles a week and is not having any significant pain or issues at this time.  He denies any chronic pain or instability the knee no recurrent swelling.    Past Medical History:   Past Medical History:   Diagnosis Date    ELE positive      Past Surgical History:   Procedure Laterality Date    back injections       Family History   Problem Relation Age of Onset    Diabetes Father         prediabetes     Alzheimer's disease Maternal Grandmother     Heart attack Maternal Grandfather      Social History     Socioeconomic History    Marital status: Single    Number of children: 0   Occupational History     Comment:     Tobacco Use    Smoking status: Never Smoker    Smokeless tobacco: Never Used   Substance and Sexual Activity    Alcohol use: Yes    Drug use: Never    Sexual activity: Not Currently     Medication List with Changes/Refills   Current Medications    DULOXETINE (CYMBALTA) 30 MG CAPSULE    Take 1 capsule (30 mg total) by mouth once daily.    HYDROXYZINE HCL (ATARAX) 10 MG TAB    Take 1/2 tablet ( 5 mg) to 1 tablet (10 mg) twice a day, as needed for anxiety or panic attacks    LISDEXAMFETAMINE (VYVANSE) 10 MG CAP    Take 10 mg by mouth once daily.    MELOXICAM (MOBIC) 7.5 MG TABLET    Take 1 tablet (7.5 mg total) by mouth once daily.     Review of patient's allergies indicates:  No Known Allergies    Physical Exam:   Body mass index is 23.69 kg/m².    GENERAL: Well  appearing, in no acute distress.  HEAD: Normocephalic and atraumatic.  ENT: External ears and nose grossly normal.  EYES: EOMI bilaterally  PULMONARY: Respirations are grossly even and non-labored.  NEURO: Awake, alert, and oriented x 3.  SKIN: No obvious rashes appreciated.  PSYCH: Mood & affect are appropriate.    Detailed MSK exam:     No tenderness over lateral joint line normal gait    Imaging:  MRI Knee Without Contrast Left  Narrative: EXAMINATION:  MRI KNEE WITHOUT CONTRAST LEFT    CLINICAL HISTORY:  Knee trauma, internal derangement suspected, xray done;Left lateral meniscus tear;Other meniscus derangements, unspecified lateral meniscus, left knee    TECHNIQUE:  Multiplanar, multisequence images were performed about the knee.    COMPARISON:  None    FINDINGS:  Medial compartment: Subtle increased signal within the posterior horn of the medial meniscus not meeting criteria for tear (series 6 sagittal 19).  No significant chondral loss    Lateral compartment: The lateral meniscus and lateral supporting structures are grossly intact. No significant chondral loss    Intercondylar notch: The anterior posterior cruciate ligaments are grossly intact    Patellofemoral compartment:The extensor mechanism is grossly intact.  No patellar tilt or subluxation is present.  No significant chondral loss    General: No acute stress or traumatic fracture.  No significant popliteal cyst.  No significant joint effusion  Impression: Subtle increased signal within the posterior horn medial meniscus not meeting criteria for tear.    No additional evidence of internal derangement.    Electronically signed by: Arden Scott  Date:    08/10/2022  Time:    17:41    Relevant imaging results were reviewed and interpreted by me and per my read as above.  This was discussed with the patient and / or family today.     Assessment:  Clinton Combs is a 32 y.o. male presents today for left knee MRI follow-up.  Normal left knee MRI he no  obvious cause an MRI for lateral knee pain at this time possible insertional distal hamstring tendinopathy still in the differential.  Could be secondary from left hip as well we discussed slowly progressing back into activity as tolerated if having any issues or new hip symptoms to return back to clinic for repeat evaluation.  Follow-up with me as needed    Hamstring tendinitis of left thigh           Bashir Guerrero MD    Disclaimer: This note was prepared using a voice recognition system and is likely to have sound alike errors within the text.

## 2022-08-26 ENCOUNTER — PATIENT OUTREACH (OUTPATIENT)
Dept: ADMINISTRATIVE | Facility: HOSPITAL | Age: 32
End: 2022-08-26
Payer: COMMERCIAL

## 2022-09-20 ENCOUNTER — OFFICE VISIT (OUTPATIENT)
Dept: PRIMARY CARE CLINIC | Facility: CLINIC | Age: 32
End: 2022-09-20
Payer: COMMERCIAL

## 2022-09-20 VITALS
SYSTOLIC BLOOD PRESSURE: 132 MMHG | WEIGHT: 156.19 LBS | BODY MASS INDEX: 22.36 KG/M2 | HEIGHT: 70 IN | TEMPERATURE: 97 F | DIASTOLIC BLOOD PRESSURE: 86 MMHG | HEART RATE: 68 BPM | OXYGEN SATURATION: 97 %

## 2022-09-20 DIAGNOSIS — R41.840 ATTENTION AND CONCENTRATION DEFICIT: ICD-10-CM

## 2022-09-20 DIAGNOSIS — I47.9 TACHYCARDIA, PAROXYSMAL: Primary | ICD-10-CM

## 2022-09-20 DIAGNOSIS — F90.0 ADHD (ATTENTION DEFICIT HYPERACTIVITY DISORDER), INATTENTIVE TYPE: ICD-10-CM

## 2022-09-20 PROCEDURE — 3008F BODY MASS INDEX DOCD: CPT | Mod: CPTII,S$GLB,, | Performed by: NURSE PRACTITIONER

## 2022-09-20 PROCEDURE — 3079F PR MOST RECENT DIASTOLIC BLOOD PRESSURE 80-89 MM HG: ICD-10-PCS | Mod: CPTII,S$GLB,, | Performed by: NURSE PRACTITIONER

## 2022-09-20 PROCEDURE — 3008F PR BODY MASS INDEX (BMI) DOCUMENTED: ICD-10-PCS | Mod: CPTII,S$GLB,, | Performed by: NURSE PRACTITIONER

## 2022-09-20 PROCEDURE — 3075F SYST BP GE 130 - 139MM HG: CPT | Mod: CPTII,S$GLB,, | Performed by: NURSE PRACTITIONER

## 2022-09-20 PROCEDURE — 1160F PR REVIEW ALL MEDS BY PRESCRIBER/CLIN PHARMACIST DOCUMENTED: ICD-10-PCS | Mod: CPTII,S$GLB,, | Performed by: NURSE PRACTITIONER

## 2022-09-20 PROCEDURE — 1159F PR MEDICATION LIST DOCUMENTED IN MEDICAL RECORD: ICD-10-PCS | Mod: CPTII,S$GLB,, | Performed by: NURSE PRACTITIONER

## 2022-09-20 PROCEDURE — 1160F RVW MEDS BY RX/DR IN RCRD: CPT | Mod: CPTII,S$GLB,, | Performed by: NURSE PRACTITIONER

## 2022-09-20 PROCEDURE — 99999 PR PBB SHADOW E&M-EST. PATIENT-LVL IV: ICD-10-PCS | Mod: PBBFAC,,, | Performed by: NURSE PRACTITIONER

## 2022-09-20 PROCEDURE — 99999 PR PBB SHADOW E&M-EST. PATIENT-LVL IV: CPT | Mod: PBBFAC,,, | Performed by: NURSE PRACTITIONER

## 2022-09-20 PROCEDURE — 99395 PREV VISIT EST AGE 18-39: CPT | Mod: S$GLB,,, | Performed by: NURSE PRACTITIONER

## 2022-09-20 PROCEDURE — 3075F PR MOST RECENT SYSTOLIC BLOOD PRESS GE 130-139MM HG: ICD-10-PCS | Mod: CPTII,S$GLB,, | Performed by: NURSE PRACTITIONER

## 2022-09-20 PROCEDURE — 3079F DIAST BP 80-89 MM HG: CPT | Mod: CPTII,S$GLB,, | Performed by: NURSE PRACTITIONER

## 2022-09-20 PROCEDURE — 99395 PR PREVENTIVE VISIT,EST,18-39: ICD-10-PCS | Mod: S$GLB,,, | Performed by: NURSE PRACTITIONER

## 2022-09-20 PROCEDURE — 1159F MED LIST DOCD IN RCRD: CPT | Mod: CPTII,S$GLB,, | Performed by: NURSE PRACTITIONER

## 2022-09-20 NOTE — PROGRESS NOTES
Disclaimer:  This note is prepared using voice recognition software and as such is likely to have errors and has not been proof read. Please contact me for questions.    Subjective:      Patient ID: Clinton Combs is a 32 y.o. male.    Chief Complaint: Annual Exam (General health checkup and discussion of Vyvanse prescription refill (I can no longer contact my old psychiatrist/monitoring heart rate and sometimes it can get up to 200)    Mr. Combs presents for annual exam. Was seeing psychiatry for ADD, but can't get in to see provider anymore. Wanting to switch Rx and ADD management to primary care. Doing well on Vyvanse. Only takes it once or twice weekly. Was prescribed cymbalta but never did start it.    Started running again. Now heart rate going up over 200, but seems like it goes up very quickly when he starts exercising. On occasion had some swelling in fingers and feet with running -- admits might have been to higher sodium intake. Hasn't had the swelling in a while but wanted to mention it. No palpitations, CP, or SOB. Resting heart rate usually around 70's. Maternal grandfather had MI and passed away in mid-70's. Dad and paternal grandfather have a-fib, unknown ages at onset.           Review of Systems   Constitutional:  Negative for activity change and unexpected weight change.   HENT:  Negative for hearing loss, rhinorrhea and trouble swallowing.    Eyes:  Negative for discharge and visual disturbance.   Respiratory:  Negative for chest tightness and wheezing.    Cardiovascular:  Negative for chest pain and palpitations.   Gastrointestinal:  Negative for blood in stool, constipation, diarrhea and vomiting.   Endocrine: Negative for polydipsia and polyuria.   Genitourinary:  Negative for difficulty urinating, hematuria and urgency.   Musculoskeletal:  Positive for arthralgias and neck pain. Negative for joint swelling.   Skin: Negative.    Neurological:  Negative for weakness and headaches.  "  Psychiatric/Behavioral:  Negative for confusion and dysphoric mood.      Objective:   /86   Pulse 68   Temp 97.3 °F (36.3 °C)   Ht 5' 10" (1.778 m)   Wt 70.8 kg (156 lb 3.1 oz)   SpO2 97%   BMI 22.41 kg/m²   Physical Exam  Vitals reviewed.   Constitutional:       Appearance: Normal appearance. He is well-developed.   HENT:      Head: Normocephalic and atraumatic.      Right Ear: Tympanic membrane, ear canal and external ear normal.      Left Ear: Tympanic membrane, ear canal and external ear normal.      Nose: Nose normal.      Mouth/Throat:      Mouth: Mucous membranes are moist.      Pharynx: Oropharynx is clear. Uvula midline. No posterior oropharyngeal erythema.   Eyes:      General: No scleral icterus.     Conjunctiva/sclera: Conjunctivae normal.   Cardiovascular:      Rate and Rhythm: Normal rate and regular rhythm.      Pulses: Normal pulses.      Heart sounds: Normal heart sounds. No murmur heard.    No friction rub. No gallop.   Pulmonary:      Effort: Pulmonary effort is normal. No respiratory distress.      Breath sounds: Normal breath sounds. No wheezing.   Abdominal:      General: Bowel sounds are normal.      Palpations: Abdomen is soft.      Tenderness: There is no abdominal tenderness.   Musculoskeletal:         General: Normal range of motion.      Cervical back: Normal range of motion and neck supple.      Right lower leg: No edema.      Left lower leg: No edema.   Lymphadenopathy:      Cervical: No cervical adenopathy.   Skin:     General: Skin is warm and dry.      Capillary Refill: Capillary refill takes less than 2 seconds.      Findings: No rash.   Neurological:      General: No focal deficit present.      Mental Status: He is alert and oriented to person, place, and time.   Psychiatric:         Mood and Affect: Mood normal.         Behavior: Behavior normal. Behavior is cooperative.         Thought Content: Thought content normal.         Judgment: Judgment normal. "     Assessment:     1. Tachycardia, paroxysmal    2. Attention and concentration deficit       Plan:   Tachycardia, paroxysmal  -     Ambulatory referral/consult to Cardiology; Future; Expected date: 09/27/2022    Attention and concentration deficit    Decline labs today. Has labs checked with employer wellness exam every year. Req ELSIE to get those results. Labs in 2021 were unremarkable.  Tachycardia seems to be with exertion (up to 200 while running) or in stressful situations (up to 120 in stressful social situations or work). Also worse with ADD meds. We discussed tachycardia as a side effect of ADD meds. Also admits to not drinking enough fluids so we discussed that this can also cause tachycardia. I suspect this is likely a combination of decreased fluid intake, stimulant use, and some situational anxiousness, however, given patient's concern about the rate and his family history of a-fib, a referral was placed for cardiology.     Can refill ADD meds through us since unable to get in with psychiatry.  reviewed. Discussed requirement for q 3 month appointments with BP and HR if doing virtual.   01/06/2022 01/06/2022   1  Vyvanse 10 Mg Capsule 30.00  30  Da Rena  [id number]  [place] ([id number])  0   [place]     03/05/2021 03/05/2021   1  Vyvanse 10 Mg Capsule 30.00  30  Da Rena  [id number]  [place] ([id number])  0   [place]        No follow-ups on file.    There are no Patient Instructions on file for this visit.

## 2022-09-23 ENCOUNTER — PATIENT MESSAGE (OUTPATIENT)
Dept: PRIMARY CARE CLINIC | Facility: CLINIC | Age: 32
End: 2022-09-23
Payer: COMMERCIAL

## 2022-09-28 RX ORDER — LISDEXAMFETAMINE DIMESYLATE CAPSULES 10 MG/1
10 CAPSULE ORAL DAILY
Qty: 30 CAPSULE | Refills: 0 | Status: SHIPPED | OUTPATIENT
Start: 2022-09-28 | End: 2022-10-31 | Stop reason: SDUPTHER

## 2022-10-04 DIAGNOSIS — Z76.89 ESTABLISHING CARE WITH NEW DOCTOR, ENCOUNTER FOR: Primary | ICD-10-CM

## 2022-10-06 ENCOUNTER — HOSPITAL ENCOUNTER (OUTPATIENT)
Dept: CARDIOLOGY | Facility: HOSPITAL | Age: 32
Discharge: HOME | End: 2022-10-06
Attending: INTERNAL MEDICINE
Payer: COMMERCIAL

## 2022-10-06 ENCOUNTER — OFFICE VISIT (OUTPATIENT)
Dept: CARDIOLOGY | Facility: CLINIC | Age: 32
End: 2022-10-06
Payer: COMMERCIAL

## 2022-10-06 VITALS
BODY MASS INDEX: 22.96 KG/M2 | HEART RATE: 64 BPM | SYSTOLIC BLOOD PRESSURE: 142 MMHG | DIASTOLIC BLOOD PRESSURE: 82 MMHG | WEIGHT: 160 LBS | OXYGEN SATURATION: 98 %

## 2022-10-06 DIAGNOSIS — I47.9 TACHYCARDIA, PAROXYSMAL: Primary | ICD-10-CM

## 2022-10-06 DIAGNOSIS — Z76.89 ESTABLISHING CARE WITH NEW DOCTOR, ENCOUNTER FOR: ICD-10-CM

## 2022-10-06 DIAGNOSIS — F41.9 ANXIETY: ICD-10-CM

## 2022-10-06 PROCEDURE — 99204 PR OFFICE/OUTPT VISIT, NEW, LEVL IV, 45-59 MIN: ICD-10-PCS | Mod: S$GLB,,, | Performed by: INTERNAL MEDICINE

## 2022-10-06 PROCEDURE — 93010 ELECTROCARDIOGRAM REPORT: CPT | Mod: ,,, | Performed by: INTERNAL MEDICINE

## 2022-10-06 PROCEDURE — 3077F PR MOST RECENT SYSTOLIC BLOOD PRESSURE >= 140 MM HG: ICD-10-PCS | Mod: CPTII,S$GLB,, | Performed by: INTERNAL MEDICINE

## 2022-10-06 PROCEDURE — 3077F SYST BP >= 140 MM HG: CPT | Mod: CPTII,S$GLB,, | Performed by: INTERNAL MEDICINE

## 2022-10-06 PROCEDURE — 93010 EKG 12-LEAD: ICD-10-PCS | Mod: ,,, | Performed by: INTERNAL MEDICINE

## 2022-10-06 PROCEDURE — 3008F PR BODY MASS INDEX (BMI) DOCUMENTED: ICD-10-PCS | Mod: CPTII,S$GLB,, | Performed by: INTERNAL MEDICINE

## 2022-10-06 PROCEDURE — 99999 PR PBB SHADOW E&M-EST. PATIENT-LVL III: ICD-10-PCS | Mod: PBBFAC,,, | Performed by: INTERNAL MEDICINE

## 2022-10-06 PROCEDURE — 3079F PR MOST RECENT DIASTOLIC BLOOD PRESSURE 80-89 MM HG: ICD-10-PCS | Mod: CPTII,S$GLB,, | Performed by: INTERNAL MEDICINE

## 2022-10-06 PROCEDURE — 99204 OFFICE O/P NEW MOD 45 MIN: CPT | Mod: S$GLB,,, | Performed by: INTERNAL MEDICINE

## 2022-10-06 PROCEDURE — 1160F PR REVIEW ALL MEDS BY PRESCRIBER/CLIN PHARMACIST DOCUMENTED: ICD-10-PCS | Mod: CPTII,S$GLB,, | Performed by: INTERNAL MEDICINE

## 2022-10-06 PROCEDURE — 93005 ELECTROCARDIOGRAM TRACING: CPT

## 2022-10-06 PROCEDURE — 1159F MED LIST DOCD IN RCRD: CPT | Mod: CPTII,S$GLB,, | Performed by: INTERNAL MEDICINE

## 2022-10-06 PROCEDURE — 3008F BODY MASS INDEX DOCD: CPT | Mod: CPTII,S$GLB,, | Performed by: INTERNAL MEDICINE

## 2022-10-06 PROCEDURE — 1159F PR MEDICATION LIST DOCUMENTED IN MEDICAL RECORD: ICD-10-PCS | Mod: CPTII,S$GLB,, | Performed by: INTERNAL MEDICINE

## 2022-10-06 PROCEDURE — 1160F RVW MEDS BY RX/DR IN RCRD: CPT | Mod: CPTII,S$GLB,, | Performed by: INTERNAL MEDICINE

## 2022-10-06 PROCEDURE — 3079F DIAST BP 80-89 MM HG: CPT | Mod: CPTII,S$GLB,, | Performed by: INTERNAL MEDICINE

## 2022-10-06 PROCEDURE — 99999 PR PBB SHADOW E&M-EST. PATIENT-LVL III: CPT | Mod: PBBFAC,,, | Performed by: INTERNAL MEDICINE

## 2022-10-06 NOTE — PROGRESS NOTES
Subjective:   Patient ID:  Clinton Combs is a 32 y.o. male who presents for evaluation of No chief complaint on file.      32 yr male, referred for tachy paroxysmal  PMH anxiety and ADHD on adderal, vyvanse  Exercise HR upto 200 bpm, jogging 30 minutes with swelling hands and feet numbness  No palpitation dyspnea chest pain faint,   No tachy at rest and night, no h/o syncope  Occasional drinking. No smoking.  Father had afib at 68. Mother health.  Ekg NSR QTc 410 ms            Past Medical History:   Diagnosis Date    ELE positive        Past Surgical History:   Procedure Laterality Date    back injections         Social History     Tobacco Use    Smoking status: Never    Smokeless tobacco: Never   Substance Use Topics    Alcohol use: Yes    Drug use: Never       Family History   Problem Relation Age of Onset    Diabetes Father         prediabetes     Alzheimer's disease Maternal Grandmother     Heart attack Maternal Grandfather        Review of Systems   Constitutional: Negative for decreased appetite, diaphoresis, fever, malaise/fatigue and night sweats.   HENT:  Negative for nosebleeds.    Eyes:  Negative for blurred vision and double vision.   Cardiovascular:  Negative for chest pain, claudication, dyspnea on exertion, irregular heartbeat, leg swelling, near-syncope, orthopnea, palpitations, paroxysmal nocturnal dyspnea and syncope.   Respiratory:  Negative for cough, shortness of breath, sleep disturbances due to breathing, snoring, sputum production and wheezing.    Endocrine: Negative for cold intolerance and polyuria.   Hematologic/Lymphatic: Does not bruise/bleed easily.   Skin:  Negative for rash.   Musculoskeletal:  Negative for back pain, falls, joint pain, joint swelling and neck pain.   Gastrointestinal:  Negative for abdominal pain, heartburn, nausea and vomiting.   Genitourinary:  Negative for dysuria, frequency and hematuria.   Neurological:  Negative for difficulty with concentration, dizziness,  focal weakness, headaches, light-headedness, numbness, seizures and weakness.   Psychiatric/Behavioral:  Negative for depression, memory loss and substance abuse. The patient is nervous/anxious. The patient does not have insomnia.    Allergic/Immunologic: Negative for HIV exposure and hives.     Objective:   Physical Exam  HENT:      Head: Normocephalic.   Eyes:      Pupils: Pupils are equal, round, and reactive to light.   Neck:      Thyroid: No thyromegaly.      Vascular: Normal carotid pulses. No carotid bruit or JVD.   Cardiovascular:      Rate and Rhythm: Normal rate and regular rhythm. No extrasystoles are present.     Chest Wall: PMI is not displaced.      Pulses: Normal pulses.      Heart sounds: Normal heart sounds. No murmur heard.    No gallop. No S3 sounds.   Pulmonary:      Effort: No respiratory distress.      Breath sounds: Normal breath sounds. No stridor.   Abdominal:      General: Bowel sounds are normal.      Palpations: Abdomen is soft.      Tenderness: There is no abdominal tenderness. There is no rebound.   Musculoskeletal:         General: Normal range of motion.   Skin:     Findings: No rash.   Neurological:      Mental Status: He is alert and oriented to person, place, and time.   Psychiatric:         Behavior: Behavior normal.       Lab Results   Component Value Date    CHOL 177 02/11/2021     Lab Results   Component Value Date    HDL 74 02/11/2021     Lab Results   Component Value Date    LDLCALC 96 08/16/2022    LDLCALC 94.4 02/11/2021     Lab Results   Component Value Date    TRIG 66 08/16/2022    TRIG 43 02/11/2021     Lab Results   Component Value Date    CHOLHDL 41.8 02/11/2021       Chemistry        Component Value Date/Time     02/11/2021 0827    K 4.4 02/11/2021 0827     02/11/2021 0827    CO2 26 02/11/2021 0827    BUN 13 02/11/2021 0827    CREATININE 1.1 02/11/2021 0827    GLU 77 02/11/2021 0827        Component Value Date/Time    CALCIUM 9.6 02/11/2021 0827    ALKPHOS  62 02/11/2021 0827    AST 23 02/11/2021 0827    ALT 22 02/11/2021 0827    BILITOT 0.4 02/11/2021 0827    ESTGFRAFRICA >60.0 02/11/2021 0827    EGFRNONAA >60.0 02/11/2021 0827          No results found for: LABA1C, HGBA1C  Lab Results   Component Value Date    TSH 1.864 02/11/2021     No results found for: INR, PROTIME  Lab Results   Component Value Date    WBC 6.51 02/11/2021    HGB 16.1 02/11/2021    HCT 49.4 02/11/2021    MCV 93 02/11/2021     02/11/2021     BNP  @LABRCNTIP(BNP,BNPTRIAGEBLO)@  CrCl cannot be calculated (Patient's most recent lab result is older than the maximum 7 days allowed.).  No results found in the last 24 hours.  No results found in the last 24 hours.  No results found in the last 24 hours.    Assessment:      1. Tachycardia, paroxysmal    2. Anxiety        Plan:   Tachycardia, paroxysmal  -     Ambulatory referral/consult to Cardiology  -     Cardiac Monitor - 3-15 Day Adult (Cupid Only); Future  -     Echo; Future    Anxiety      Counseled DASH  Recommend heart-healthy diet, weight control and regular exercise.  [place]. Risk modification.   I have reviewed all pertinent labs and cardiac studies independently. Plans and recommendations have been formulated under my direct supervision. All questions answered and patient voiced understanding.   If symptoms persist go to the ED  F/u with pcp

## 2022-11-01 ENCOUNTER — PATIENT MESSAGE (OUTPATIENT)
Dept: CARDIOLOGY | Facility: HOSPITAL | Age: 32
End: 2022-11-01
Payer: COMMERCIAL

## 2022-11-04 ENCOUNTER — OFFICE VISIT (OUTPATIENT)
Dept: SPORTS MEDICINE | Facility: CLINIC | Age: 32
End: 2022-11-04
Payer: COMMERCIAL

## 2022-11-04 DIAGNOSIS — M25.852 LEFT HIP IMPINGEMENT SYNDROME: ICD-10-CM

## 2022-11-04 DIAGNOSIS — S73.192D TEAR OF LEFT ACETABULAR LABRUM, SUBSEQUENT ENCOUNTER: ICD-10-CM

## 2022-11-04 DIAGNOSIS — M25.559 PAIN IN UNSPECIFIED HIP: Primary | ICD-10-CM

## 2022-11-04 PROCEDURE — 99214 PR OFFICE/OUTPT VISIT, EST, LEVL IV, 30-39 MIN: ICD-10-PCS | Mod: S$GLB,,, | Performed by: STUDENT IN AN ORGANIZED HEALTH CARE EDUCATION/TRAINING PROGRAM

## 2022-11-04 PROCEDURE — 99999 PR PBB SHADOW E&M-EST. PATIENT-LVL III: CPT | Mod: PBBFAC,,, | Performed by: STUDENT IN AN ORGANIZED HEALTH CARE EDUCATION/TRAINING PROGRAM

## 2022-11-04 PROCEDURE — 99999 PR PBB SHADOW E&M-EST. PATIENT-LVL III: ICD-10-PCS | Mod: PBBFAC,,, | Performed by: STUDENT IN AN ORGANIZED HEALTH CARE EDUCATION/TRAINING PROGRAM

## 2022-11-04 PROCEDURE — 99214 OFFICE O/P EST MOD 30 MIN: CPT | Mod: S$GLB,,, | Performed by: STUDENT IN AN ORGANIZED HEALTH CARE EDUCATION/TRAINING PROGRAM

## 2022-11-04 NOTE — PROGRESS NOTES
Patient ID: Clinton Combs  YOB: 1990  MRN: [id number]    Chief Complaint: Follow-up (Meniscus, lateral, derangement, left)      History of Present Illness: Clinton Combs is a right-hand dominant 32 y.o. male who presents today with 2/10 pain c/o Follow-up Meniscus, lateral, derangement, left.     The patient is active in  running .  Occupation:      32-year-old male following up today for left hip pain.  Has been doing formal physical therapy and has continued to have worsening left anterior hip pain.  History of right hip pain in the past with labral injury managed conservatively.  He notes pain in the left groin specifically worse after activity and ambulation as well as running.    Past Medical History:   Past Medical History:   Diagnosis Date    ELE positive      Past Surgical History:   Procedure Laterality Date    back injections       Family History   Problem Relation Age of Onset    Diabetes Father         prediabetes     Alzheimer's disease Maternal Grandmother     Heart attack Maternal Grandfather      Social History     Socioeconomic History    Marital status: Single    Number of children: 0   Occupational History     Comment:     Tobacco Use    Smoking status: Never    Smokeless tobacco: Never   Substance and Sexual Activity    Alcohol use: Yes    Drug use: Never    Sexual activity: Not Currently     Medication List with Changes/Refills   Current Medications    DULOXETINE (CYMBALTA) 30 MG CAPSULE    Take 1 capsule (30 mg total) by mouth once daily.   Changed and/or Refilled Medications    Modified Medication Previous Medication    LISDEXAMFETAMINE (VYVANSE) 10 MG CAP lisdexamfetamine (VYVANSE) 10 mg Cap       Take 10 mg by mouth once daily.    Take 10 mg by mouth once daily.     Review of patient's allergies indicates:  No Known Allergies    Physical Exam:   There is no height or weight on file to calculate BMI.    GENERAL: Well appearing, in no acute  distress.  HEAD: Normocephalic and atraumatic.  ENT: External ears and nose grossly normal.  EYES: EOMI bilaterally  PULMONARY: Respirations are grossly even and non-labored.  NEURO: Awake, alert, and oriented x 3.  SKIN: No obvious rashes appreciated.  PSYCH: Mood & affect are appropriate.    Detailed MSK exam:     Left hip exam   Negative logroll range of motion 125/55/30 positive FADIR negative GABY no pain with supine hip flexion negative tenderness over lateral joint line pain with dynamic labral stressing anteriorly.  Mild tenderness over the left SI joint    Imaging:  MRI Knee Without Contrast Left  Narrative: EXAMINATION:  MRI KNEE WITHOUT CONTRAST LEFT    CLINICAL HISTORY:  Knee trauma, internal derangement suspected, xray done;Left lateral meniscus tear;Other meniscus derangements, unspecified lateral meniscus, left knee    TECHNIQUE:  Multiplanar, multisequence images were performed about the knee.    COMPARISON:  None    FINDINGS:  Medial compartment: Subtle increased signal within the posterior horn of the medial meniscus not meeting criteria for tear (series 6 sagittal 19).  No significant chondral loss    Lateral compartment: The lateral meniscus and lateral supporting structures are grossly intact. No significant chondral loss    Intercondylar notch: The anterior posterior cruciate ligaments are grossly intact    Patellofemoral compartment:The extensor mechanism is grossly intact.  No patellar tilt or subluxation is present.  No significant chondral loss    General: No acute stress or traumatic fracture.  No significant popliteal cyst.  No significant joint effusion  Impression: Subtle increased signal within the posterior horn medial meniscus not meeting criteria for tear.    No additional evidence of internal derangement.    Electronically signed by: Arden Scott  Date:    08/10/2022  Time:    17:41    Relevant imaging results were reviewed and interpreted by me and per my read as above.  This  was discussed with the patient and / or family today.     Assessment:  Clinton Combs is a 32 y.o. male presents today for left anterior hip pain most consistent with hip impingement and possible labral pathology.  He has done formal therapy for this for greater than 6 weeks continues have significant pain worsening and unable to continue with running and daily activities as his pain has become more constant and localizes more to the hip joint itself.  Discussed moving forward with an MR arthrogram to left hip for further evaluation and management and rule out any surgical need at this time as he has failed all conservative management.  Follow-up with me after MRI.    Pain in unspecified hip    Left hip impingement syndrome  -     X-Ray Arthrogram Hip Left, COMPLETE with MRI to follow (XPD); Future; Expected date: 11/04/2022  -     MRI Arthrogram Hip With Contrast Left; Future; Expected date: 11/04/2022    Tear of left acetabular labrum, subsequent encounter  -     X-Ray Arthrogram Hip Left, COMPLETE with MRI to follow (XPD); Future; Expected date: 11/04/2022  -     MRI Arthrogram Hip With Contrast Left; Future; Expected date: 11/04/2022         Bashir Guerrero MD    Disclaimer: This note was prepared using a voice recognition system and is likely to have sound alike errors within the text.

## 2022-11-29 ENCOUNTER — HOSPITAL ENCOUNTER (OUTPATIENT)
Dept: RADIOLOGY | Facility: HOSPITAL | Age: 32
Discharge: HOME | End: 2022-11-29
Attending: STUDENT IN AN ORGANIZED HEALTH CARE EDUCATION/TRAINING PROGRAM
Payer: COMMERCIAL

## 2022-11-29 DIAGNOSIS — M25.852 LEFT HIP IMPINGEMENT SYNDROME: ICD-10-CM

## 2022-11-29 DIAGNOSIS — S73.192D TEAR OF LEFT ACETABULAR LABRUM, SUBSEQUENT ENCOUNTER: ICD-10-CM

## 2022-11-29 PROCEDURE — 25000003 PHARM REV CODE 250: Performed by: STUDENT IN AN ORGANIZED HEALTH CARE EDUCATION/TRAINING PROGRAM

## 2022-11-29 PROCEDURE — 25500020 PHARM REV CODE 255: Performed by: STUDENT IN AN ORGANIZED HEALTH CARE EDUCATION/TRAINING PROGRAM

## 2022-11-29 PROCEDURE — 73722 MRI JOINT OF LWR EXTR W/DYE: CPT | Mod: 26,LT,, | Performed by: RADIOLOGY

## 2022-11-29 PROCEDURE — 27093 XR ARTHROGRAM HIP LEFT WITH MRI TO FOLLOW (XPD): ICD-10-PCS | Mod: LT,,, | Performed by: RADIOLOGY

## 2022-11-29 PROCEDURE — 73525 CONTRAST X-RAY OF HIP: CPT | Mod: TC,LT

## 2022-11-29 PROCEDURE — 73722 MRI ARTHROGRAM HIP WITH CONTRAST LEFT: ICD-10-PCS | Mod: 26,LT,, | Performed by: RADIOLOGY

## 2022-11-29 PROCEDURE — 73722 MRI JOINT OF LWR EXTR W/DYE: CPT | Mod: TC,LT

## 2022-11-29 PROCEDURE — 27093 INJECTION FOR HIP X-RAY: CPT | Mod: LT,,, | Performed by: RADIOLOGY

## 2022-11-29 PROCEDURE — A9585 GADOBUTROL INJECTION: HCPCS | Performed by: STUDENT IN AN ORGANIZED HEALTH CARE EDUCATION/TRAINING PROGRAM

## 2022-11-29 PROCEDURE — 73525 CONTRAST X-RAY OF HIP: CPT | Mod: 26,LT,, | Performed by: RADIOLOGY

## 2022-11-29 PROCEDURE — 73525 XR ARTHROGRAM HIP LEFT WITH MRI TO FOLLOW (XPD): ICD-10-PCS | Mod: 26,LT,, | Performed by: RADIOLOGY

## 2022-11-29 RX ORDER — LIDOCAINE HYDROCHLORIDE 10 MG/ML
10 INJECTION INFILTRATION; PERINEURAL ONCE
Status: COMPLETED | OUTPATIENT
Start: 2022-11-29 | End: 2022-11-29

## 2022-11-29 RX ORDER — GADOBUTROL 604.72 MG/ML
7.5 INJECTION INTRAVENOUS
Status: COMPLETED | OUTPATIENT
Start: 2022-11-29 | End: 2022-11-29

## 2022-11-29 RX ADMIN — IOHEXOL 20 ML: 350 INJECTION, SOLUTION INTRAVENOUS at 02:11

## 2022-11-29 RX ADMIN — LIDOCAINE HYDROCHLORIDE 10 ML: 10 INJECTION, SOLUTION INFILTRATION; PERINEURAL at 04:11

## 2022-11-29 RX ADMIN — GADOBUTROL 0.1 ML: 604.72 INJECTION INTRAVENOUS at 02:11

## 2022-12-05 ENCOUNTER — OFFICE VISIT (OUTPATIENT)
Dept: SPORTS MEDICINE | Facility: CLINIC | Age: 32
End: 2022-12-05
Payer: COMMERCIAL

## 2022-12-05 VITALS — BODY MASS INDEX: 23.7 KG/M2 | RESPIRATION RATE: 20 BRPM | HEIGHT: 70 IN | WEIGHT: 165.56 LBS

## 2022-12-05 DIAGNOSIS — M25.852 LEFT HIP IMPINGEMENT SYNDROME: Primary | ICD-10-CM

## 2022-12-05 DIAGNOSIS — S73.192D TEAR OF LEFT ACETABULAR LABRUM, SUBSEQUENT ENCOUNTER: ICD-10-CM

## 2022-12-05 PROCEDURE — 99214 PR OFFICE/OUTPT VISIT, EST, LEVL IV, 30-39 MIN: ICD-10-PCS | Mod: 25,S$GLB,, | Performed by: STUDENT IN AN ORGANIZED HEALTH CARE EDUCATION/TRAINING PROGRAM

## 2022-12-05 PROCEDURE — 99999 PR PBB SHADOW E&M-EST. PATIENT-LVL III: CPT | Mod: PBBFAC,,, | Performed by: STUDENT IN AN ORGANIZED HEALTH CARE EDUCATION/TRAINING PROGRAM

## 2022-12-05 PROCEDURE — 3008F PR BODY MASS INDEX (BMI) DOCUMENTED: ICD-10-PCS | Mod: CPTII,S$GLB,, | Performed by: STUDENT IN AN ORGANIZED HEALTH CARE EDUCATION/TRAINING PROGRAM

## 2022-12-05 PROCEDURE — 20611 DRAIN/INJ JOINT/BURSA W/US: CPT | Mod: LT,S$GLB,, | Performed by: STUDENT IN AN ORGANIZED HEALTH CARE EDUCATION/TRAINING PROGRAM

## 2022-12-05 PROCEDURE — 20611 LARGE JOINT ASPIRATION/INJECTION: L HIP JOINT: ICD-10-PCS | Mod: LT,S$GLB,, | Performed by: STUDENT IN AN ORGANIZED HEALTH CARE EDUCATION/TRAINING PROGRAM

## 2022-12-05 PROCEDURE — 99999 PR PBB SHADOW E&M-EST. PATIENT-LVL III: ICD-10-PCS | Mod: PBBFAC,,, | Performed by: STUDENT IN AN ORGANIZED HEALTH CARE EDUCATION/TRAINING PROGRAM

## 2022-12-05 PROCEDURE — 1159F PR MEDICATION LIST DOCUMENTED IN MEDICAL RECORD: ICD-10-PCS | Mod: CPTII,S$GLB,, | Performed by: STUDENT IN AN ORGANIZED HEALTH CARE EDUCATION/TRAINING PROGRAM

## 2022-12-05 PROCEDURE — 99214 OFFICE O/P EST MOD 30 MIN: CPT | Mod: 25,S$GLB,, | Performed by: STUDENT IN AN ORGANIZED HEALTH CARE EDUCATION/TRAINING PROGRAM

## 2022-12-05 PROCEDURE — 1159F MED LIST DOCD IN RCRD: CPT | Mod: CPTII,S$GLB,, | Performed by: STUDENT IN AN ORGANIZED HEALTH CARE EDUCATION/TRAINING PROGRAM

## 2022-12-05 PROCEDURE — 3008F BODY MASS INDEX DOCD: CPT | Mod: CPTII,S$GLB,, | Performed by: STUDENT IN AN ORGANIZED HEALTH CARE EDUCATION/TRAINING PROGRAM

## 2022-12-05 RX ORDER — TRIAMCINOLONE ACETONIDE 40 MG/ML
40 INJECTION, SUSPENSION INTRA-ARTICULAR; INTRAMUSCULAR
Status: DISCONTINUED | OUTPATIENT
Start: 2022-12-05 | End: 2022-12-05 | Stop reason: HOSPADM

## 2022-12-05 RX ADMIN — TRIAMCINOLONE ACETONIDE 40 MG: 40 INJECTION, SUSPENSION INTRA-ARTICULAR; INTRAMUSCULAR at 01:12

## 2022-12-05 NOTE — PROCEDURES
Large Joint Aspiration/Injection: L hip joint    Date/Time: 12/5/2022 1:00 PM  Performed by: Bashir Guerrero MD  Authorized by: Bashir Guerrero MD     Consent Done?:  Yes (Verbal)  Indications:  Pain  Site marked: the procedure site was marked    Timeout: prior to procedure the correct patient, procedure, and site was verified    Prep: patient was prepped and draped in usual sterile fashion      Local anesthesia used?: Yes    Local anesthetic:  Topical anesthetic    Details:  Needle Size:  22 G  Ultrasonic Guidance for needle placement?: Yes    Images are saved and documented.  Approach:  Anterior  Location:  Hip  Site:  L hip joint  Medications:  40 mg triamcinolone acetonide 40 mg/mL  Patient tolerance:  Patient tolerated the procedure well with no immediate complications     Ultrasound guidance was used for needle localization. Images were saved and stored for documentation. The appropriate structures were visualized. Dynamic visualization of the needle was continuous throughout the procedures and maintained good position.     We discussed the proper protocols after the injection such as no submerging pools, baths tubs, or hot tubs for 24 hr.  Showering is okay today.  We also discussed that blood sugars can be elevated after an injection and asked patient to properly checked her sugars over the next few days and contact their PCP if there are any concerns.  We discussed red flags such as fevers, chills, red, warm, tender joint at the area of injection to please seek medical care immediately.

## 2022-12-05 NOTE — PROGRESS NOTES
Patient ID: Clinton Combs  YOB: 1990  MRN: [id number]    Chief Complaint: Follow-up (MRI discuss findings, Left hip pain )      History of Present Illness: Clinton Combs is a right-hand dominant 32 y.o. male who presents today with 2/10 pain c/o Follow-up MRI discuss findings, Left hip pain  .     The patient is active in none.  Occupation:      32-year-old female presenting today for left hip MRI follow-up.    Past Medical History:   Past Medical History:   Diagnosis Date    ELE positive      Past Surgical History:   Procedure Laterality Date    back injections       Family History   Problem Relation Age of Onset    Diabetes Father         prediabetes     Alzheimer's disease Maternal Grandmother     Heart attack Maternal Grandfather      Social History     Socioeconomic History    Marital status: Single    Number of children: 0   Occupational History     Comment:     Tobacco Use    Smoking status: Never    Smokeless tobacco: Never   Substance and Sexual Activity    Alcohol use: Yes    Drug use: Never    Sexual activity: Not Currently     Medication List with Changes/Refills   Current Medications    DULOXETINE (CYMBALTA) 30 MG CAPSULE    Take 1 capsule (30 mg total) by mouth once daily.    LISDEXAMFETAMINE (VYVANSE) 10 MG CAP    Take 10 mg by mouth once daily.     Review of patient's allergies indicates:  No Known Allergies    Physical Exam:   Body mass index is 23.76 kg/m².    GENERAL: Well appearing, in no acute distress.  HEAD: Normocephalic and atraumatic.  ENT: External ears and nose grossly normal.  EYES: EOMI bilaterally  PULMONARY: Respirations are grossly even and non-labored.  NEURO: Awake, alert, and oriented x 3.  SKIN: No obvious rashes appreciated.  PSYCH: Mood & affect are appropriate.    Detailed MSK exam:     Tenderness over the anterior hip joint line    Imaging:  MRI Arthrogram Hip With Contrast Left  Narrative: EXAMINATION:  MRI ARTHROGRAM HIP  WITH CONTRAST LEFT    CLINICAL HISTORY:  Hip pain, chronic, labral tear suspected, xray done;  Other specified joint disorders, left hip    TECHNIQUE:  Multiplanar multisequence MR of the left hip after intra-articular contrast dilute Gadavist administration.    COMPARISON:  None    FINDINGS:  Anterior superior and anterior labral tear is present (axial image 6-11).  The remaining labrum is grossly intact.  There is no evidence of cam type femoroacetabular impingement.  Mild arthritic changes are noted.  The left hamstring origin and gluteus medius and minimus insertions are unremarkable.  Limited evaluation of the internal pelvic viscera is within normal limits.  Impression: Anterior superior and anterior labral tear.    Mild arthritic changes of the left hip.    Electronically signed by: Arden Scott  Date:    11/29/2022  Time:    21:19  X-Ray Arthrogram Hip Left, COMPLETE with MRI to follow (XPD)  Narrative: EXAMINATION:  XR ARTHROGRAM HIP LEFT WITH MRI TO FOLLOW (XPD)    CLINICAL HISTORY:  Other specified joint disorders, left hip    TECHNIQUE:  Left hip arthrogram    COMPARISON:  None    FINDINGS:  The risks and benefits of the procedure were explained to the patient and informed consent was obtained.  The patient was then placed in the supine position on the fluoroscopy table.  A site at the lateral aspect of the head neck junction was and localized under fluoroscopy.  The site was then prepped and draped in the usual sterile fashion.  Approximately 2-3 cc of 1% lidocaine was utilized for anesthetic purposes.  A 22 gauge spinal needle was then advanced into the joint without difficulty.  812 cc solution diluted gadolinium and Omnipaque was injected into the joint without difficulty.  Impression: As above    Electronically signed by: Jose Lopes DO  Date:    11/29/2022  Time:    14:55    Relevant imaging results were reviewed and interpreted by me and per my read as above.  This was discussed with the  patient and / or family today.     Assessment:  Clinton Combs is a 32 y.o. male presenting today for anterior superior labral tear.  Reviewed his MRI findings today as well as continued conservative management.  Discussed intra-articular injection with corticosteroid.  Please refer to procedure note for the details.  Continue home exercises at this time and follow-up with me as needed in the future.  He is had good results with intra-articular injection in the right hip in the past with symptomatic labrum that is doing well at this time.    Left hip impingement syndrome  -     Large Joint Aspiration/Injection: L hip joint    Tear of left acetabular labrum, subsequent encounter  -     Large Joint Aspiration/Injection: L hip joint         Bashir Guerrero MD    Disclaimer: This note was prepared using a voice recognition system and is likely to have sound alike errors within the text.

## 2023-03-17 ENCOUNTER — TELEPHONE (OUTPATIENT)
Dept: PRIMARY CARE CLINIC | Facility: CLINIC | Age: 33
End: 2023-03-17
Payer: COMMERCIAL

## 2023-06-05 ENCOUNTER — OFFICE VISIT (OUTPATIENT)
Dept: PRIMARY CARE CLINIC | Facility: CLINIC | Age: 33
End: 2023-06-05
Payer: COMMERCIAL

## 2023-06-05 VITALS
DIASTOLIC BLOOD PRESSURE: 72 MMHG | OXYGEN SATURATION: 97 % | HEIGHT: 70 IN | WEIGHT: 164.38 LBS | TEMPERATURE: 99 F | SYSTOLIC BLOOD PRESSURE: 130 MMHG | BODY MASS INDEX: 23.53 KG/M2 | HEART RATE: 96 BPM

## 2023-06-05 DIAGNOSIS — M79.10 MUSCLE PAIN: Primary | ICD-10-CM

## 2023-06-05 PROCEDURE — 3008F BODY MASS INDEX DOCD: CPT | Mod: CPTII,S$GLB,, | Performed by: INTERNAL MEDICINE

## 2023-06-05 PROCEDURE — 1160F PR REVIEW ALL MEDS BY PRESCRIBER/CLIN PHARMACIST DOCUMENTED: ICD-10-PCS | Mod: CPTII,S$GLB,, | Performed by: INTERNAL MEDICINE

## 2023-06-05 PROCEDURE — 3075F PR MOST RECENT SYSTOLIC BLOOD PRESS GE 130-139MM HG: ICD-10-PCS | Mod: CPTII,S$GLB,, | Performed by: INTERNAL MEDICINE

## 2023-06-05 PROCEDURE — 3078F DIAST BP <80 MM HG: CPT | Mod: CPTII,S$GLB,, | Performed by: INTERNAL MEDICINE

## 2023-06-05 PROCEDURE — 1159F MED LIST DOCD IN RCRD: CPT | Mod: CPTII,S$GLB,, | Performed by: INTERNAL MEDICINE

## 2023-06-05 PROCEDURE — 3078F PR MOST RECENT DIASTOLIC BLOOD PRESSURE < 80 MM HG: ICD-10-PCS | Mod: CPTII,S$GLB,, | Performed by: INTERNAL MEDICINE

## 2023-06-05 PROCEDURE — 99202 OFFICE O/P NEW SF 15 MIN: CPT | Mod: S$GLB,,, | Performed by: INTERNAL MEDICINE

## 2023-06-05 PROCEDURE — 1159F PR MEDICATION LIST DOCUMENTED IN MEDICAL RECORD: ICD-10-PCS | Mod: CPTII,S$GLB,, | Performed by: INTERNAL MEDICINE

## 2023-06-05 PROCEDURE — 99202 PR OFFICE/OUTPT VISIT, NEW, LEVL II, 15-29 MIN: ICD-10-PCS | Mod: S$GLB,,, | Performed by: INTERNAL MEDICINE

## 2023-06-05 PROCEDURE — 1160F RVW MEDS BY RX/DR IN RCRD: CPT | Mod: CPTII,S$GLB,, | Performed by: INTERNAL MEDICINE

## 2023-06-05 PROCEDURE — 99999 PR PBB SHADOW E&M-EST. PATIENT-LVL IV: CPT | Mod: PBBFAC,,, | Performed by: INTERNAL MEDICINE

## 2023-06-05 PROCEDURE — 3075F SYST BP GE 130 - 139MM HG: CPT | Mod: CPTII,S$GLB,, | Performed by: INTERNAL MEDICINE

## 2023-06-05 PROCEDURE — 3008F PR BODY MASS INDEX (BMI) DOCUMENTED: ICD-10-PCS | Mod: CPTII,S$GLB,, | Performed by: INTERNAL MEDICINE

## 2023-06-05 PROCEDURE — 99999 PR PBB SHADOW E&M-EST. PATIENT-LVL IV: ICD-10-PCS | Mod: PBBFAC,,, | Performed by: INTERNAL MEDICINE

## 2023-06-05 RX ORDER — METHOCARBAMOL 750 MG/1
750 TABLET, FILM COATED ORAL NIGHTLY
COMMUNITY
Start: 2023-05-02

## 2023-06-05 RX ORDER — KETOROLAC TROMETHAMINE 10 MG/1
10 TABLET, FILM COATED ORAL EVERY 6 HOURS
Qty: 20 TABLET | Refills: 0 | Status: SHIPPED | OUTPATIENT
Start: 2023-06-05 | End: 2023-06-10

## 2023-06-05 NOTE — PROGRESS NOTES
Subjective     Patient ID: Clinton Combs is a 33 y.o. male.    Chief Complaint: Back Pain (Pulled muscle x 5 weeks )      Back Pain  This is a new problem. The current episode started more than 1 month ago. The problem occurs constantly. The problem has been waxing and waning since onset. The pain is present in the thoracic spine and costovertebral angle. The quality of the pain is described as burning and stabbing. The pain does not radiate. The pain is at a severity of 4/10. The pain is moderate. The pain is Worse during the night. The symptoms are aggravated by sitting. Stiffness is present All day. Associated symptoms include chest pain and weight loss. Pertinent negatives include no abdominal pain, bladder incontinence, bowel incontinence, dysuria, fever, headaches, leg pain, numbness, paresis, paresthesias, pelvic pain, perianal numbness, tingling or weakness. Risk factors include recent trauma. The treatment provided mild relief.     Pt was doing dumbell wing lifts about 5 weeks ago.  Felt pain near left trap area.  Since then on/off burning stinging that can wrap around following the rib.  No muscle weakness, he feels like the pain makes his stomach feel weak, for clarity.  Has tried ice/heat.  Started exercising again recently but with reduced weights.    Past Medical History:   Diagnosis Date    ELE positive      Review of patient's allergies indicates:  No Known Allergies  Past Surgical History:   Procedure Laterality Date    back injections       Family History   Problem Relation Age of Onset    Diabetes Father         prediabetes     Alzheimer's disease Maternal Grandmother     Heart attack Maternal Grandfather      Social History     Socioeconomic History    Marital status: Single    Number of children: 0   Occupational History     Comment:     Tobacco Use    Smoking status: Never    Smokeless tobacco: Never   Substance and Sexual Activity    Alcohol use: Yes    Drug use: Never     "Sexual activity: Not Currently         /72   Pulse 96   Temp 98.5 °F (36.9 °C) (Oral)   Ht 5' 10" (1.778 m)   Wt 74.6 kg (164 lb 5.7 oz)   SpO2 97%   BMI 23.58 kg/m²   Outpatient Medications as of 6/5/2023   Medication Sig Dispense Refill    lisdexamfetamine (VYVANSE) 10 mg Cap Take 10 mg by mouth once daily. 30 capsule 0    methocarbamoL (ROBAXIN) 750 MG Tab Take 750 mg by mouth every evening.      DULoxetine (CYMBALTA) 30 MG capsule Take 1 capsule (30 mg total) by mouth once daily. 30 capsule 2     No current facility-administered medications on file as of 6/5/2023.       Review of Systems   Constitutional:  Positive for weight loss. Negative for fever.   Cardiovascular:  Positive for chest pain.   Gastrointestinal:  Negative for abdominal pain and bowel incontinence.   Genitourinary:  Negative for bladder incontinence, dysuria, hematuria and pelvic pain.   Musculoskeletal:  Positive for back pain. Negative for leg pain.   Neurological:  Negative for tingling, weakness, numbness, headaches and paresthesias.   All other systems reviewed and are negative.       Objective     Physical Exam  Constitutional:       Appearance: Normal appearance.   HENT:      Head: Normocephalic and atraumatic.   Cardiovascular:      Rate and Rhythm: Normal rate and regular rhythm.      Heart sounds: No murmur heard.    No friction rub. No gallop.   Pulmonary:      Effort: Pulmonary effort is normal. No respiratory distress.      Breath sounds: No wheezing or rales.   Musculoskeletal:         General: No swelling.      Comments: Spine NTTP  TTP left trap area medial to scapula with radiation around thoracic rib   Skin:     General: Skin is dry.   Neurological:      General: No focal deficit present.      Mental Status: He is alert and oriented to person, place, and time.          Assessment and Plan     1. Muscle pain  -     ketorolac (TORADOL) 10 mg tablet; Take 1 tablet (10 mg total) by mouth every 6 (six) hours. for 5 days "  Dispense: 20 tablet; Refill: 0       Exercises/stretches given.  D/w pt likely irritated nerve; no heavy weights.  Has no weakness; more sensory and muscle cramps in nature.      Immunization History   Administered Date(s) Administered    COVID-19 MRNA, LN-S PF (MODERNA HALF 0.25 ML DOSE) 12/13/2021    COVID-19, MRNA, LN-S, PF (MODERNA FULL 0.5 ML DOSE) 03/27/2021, 04/25/2021    DTaP 1992, 1992, 1992, 05/31/1996    HIB 1990, 1992, 1992, 09/30/1993    HPV Quadrivalent 06/18/2010    Hepatitis A, Pediatric/Adolescent, 2 Dose 08/18/2006    Hepatitis B, Pediatric/Adolescent 1992, 1992, 01/29/1993    IPV 1992, 1992, 1992, 09/30/1993, 05/31/1996, 08/18/2006    Influenza 04/01/2019    Influenza - Quadrivalent - PF *Preferred* (6 months and older) 12/06/2016, 02/11/2021, 10/18/2021    Influenza A (H1N1) 2009 Monovalent - Intranasal 12/14/2009    MMR 07/23/1993, 05/31/1996    Meningococcal Conjugate (MCV4P) 06/18/2010    Tdap 06/18/2010, 04/01/2019     I spent a total of 15 minutes on the day of the visit.This includes face to face time and non-face to face time preparing to see the patient (eg, review of tests), obtaining and/or reviewing separately obtained history, documenting clinical information in the electronic or other health record, independently interpreting results and communicating results to the patient/family/caregiver, or care coordinator.

## 2023-06-14 ENCOUNTER — PATIENT OUTREACH (OUTPATIENT)
Dept: ADMINISTRATIVE | Facility: HOSPITAL | Age: 33
End: 2023-06-14
Payer: COMMERCIAL

## 2024-05-10 ENCOUNTER — OFFICE VISIT (OUTPATIENT)
Dept: PRIMARY CARE CLINIC | Facility: CLINIC | Age: 34
End: 2024-05-10
Payer: COMMERCIAL

## 2024-05-10 ENCOUNTER — LAB VISIT (OUTPATIENT)
Dept: LAB | Facility: HOSPITAL | Age: 34
End: 2024-05-10
Attending: NURSE PRACTITIONER
Payer: COMMERCIAL

## 2024-05-10 VITALS
TEMPERATURE: 98 F | BODY MASS INDEX: 23.99 KG/M2 | SYSTOLIC BLOOD PRESSURE: 126 MMHG | WEIGHT: 167.56 LBS | OXYGEN SATURATION: 98 % | DIASTOLIC BLOOD PRESSURE: 70 MMHG | HEIGHT: 70 IN | HEART RATE: 89 BPM

## 2024-05-10 DIAGNOSIS — R39.9 UTI SYMPTOMS: Primary | ICD-10-CM

## 2024-05-10 DIAGNOSIS — F90.0 ADHD (ATTENTION DEFICIT HYPERACTIVITY DISORDER), INATTENTIVE TYPE: ICD-10-CM

## 2024-05-10 DIAGNOSIS — R39.9 UTI SYMPTOMS: ICD-10-CM

## 2024-05-10 DIAGNOSIS — F41.9 ANXIETY: ICD-10-CM

## 2024-05-10 LAB
ALBUMIN SERPL BCP-MCNC: 4.2 G/DL (ref 3.5–5.2)
ALP SERPL-CCNC: 61 U/L (ref 55–135)
ALT SERPL W/O P-5'-P-CCNC: 26 U/L (ref 10–44)
ANION GAP SERPL CALC-SCNC: 9 MMOL/L (ref 8–16)
AST SERPL-CCNC: 19 U/L (ref 10–40)
BASOPHILS # BLD AUTO: 0.13 K/UL (ref 0–0.2)
BASOPHILS NFR BLD: 1.3 % (ref 0–1.9)
BILIRUB SERPL-MCNC: 0.4 MG/DL (ref 0.1–1)
BUN SERPL-MCNC: 15 MG/DL (ref 6–20)
CALCIUM SERPL-MCNC: 9.4 MG/DL (ref 8.7–10.5)
CHLORIDE SERPL-SCNC: 104 MMOL/L (ref 95–110)
CO2 SERPL-SCNC: 25 MMOL/L (ref 23–29)
COMPLEXED PSA SERPL-MCNC: 0.51 NG/ML (ref 0–4)
CREAT SERPL-MCNC: 1 MG/DL (ref 0.5–1.4)
DIFFERENTIAL METHOD BLD: ABNORMAL
EOSINOPHIL # BLD AUTO: 0.2 K/UL (ref 0–0.5)
EOSINOPHIL NFR BLD: 1.7 % (ref 0–8)
ERYTHROCYTE [DISTWIDTH] IN BLOOD BY AUTOMATED COUNT: 13 % (ref 11.5–14.5)
EST. GFR  (NO RACE VARIABLE): >60 ML/MIN/1.73 M^2
GLUCOSE SERPL-MCNC: 94 MG/DL (ref 70–110)
HCT VFR BLD AUTO: 44.5 % (ref 40–54)
HGB BLD-MCNC: 14.7 G/DL (ref 14–18)
IMM GRANULOCYTES # BLD AUTO: 0.14 K/UL (ref 0–0.04)
IMM GRANULOCYTES NFR BLD AUTO: 1.4 % (ref 0–0.5)
LYMPHOCYTES # BLD AUTO: 3 K/UL (ref 1–4.8)
LYMPHOCYTES NFR BLD: 29.1 % (ref 18–48)
MCH RBC QN AUTO: 30.1 PG (ref 27–31)
MCHC RBC AUTO-ENTMCNC: 33 G/DL (ref 32–36)
MCV RBC AUTO: 91 FL (ref 82–98)
MONOCYTES # BLD AUTO: 0.8 K/UL (ref 0.3–1)
MONOCYTES NFR BLD: 7.9 % (ref 4–15)
NEUTROPHILS # BLD AUTO: 5.9 K/UL (ref 1.8–7.7)
NEUTROPHILS NFR BLD: 58.6 % (ref 38–73)
NRBC BLD-RTO: 0 /100 WBC
PLATELET # BLD AUTO: 333 K/UL (ref 150–450)
PMV BLD AUTO: 9.5 FL (ref 9.2–12.9)
POTASSIUM SERPL-SCNC: 4.3 MMOL/L (ref 3.5–5.1)
PROT SERPL-MCNC: 7.3 G/DL (ref 6–8.4)
RBC # BLD AUTO: 4.89 M/UL (ref 4.6–6.2)
SODIUM SERPL-SCNC: 138 MMOL/L (ref 136–145)
WBC # BLD AUTO: 10.13 K/UL (ref 3.9–12.7)

## 2024-05-10 PROCEDURE — 1160F RVW MEDS BY RX/DR IN RCRD: CPT | Mod: CPTII,S$GLB,, | Performed by: NURSE PRACTITIONER

## 2024-05-10 PROCEDURE — 3008F BODY MASS INDEX DOCD: CPT | Mod: CPTII,S$GLB,, | Performed by: NURSE PRACTITIONER

## 2024-05-10 PROCEDURE — 3074F SYST BP LT 130 MM HG: CPT | Mod: CPTII,S$GLB,, | Performed by: NURSE PRACTITIONER

## 2024-05-10 PROCEDURE — 99999 PR PBB SHADOW E&M-EST. PATIENT-LVL III: CPT | Mod: PBBFAC,,, | Performed by: NURSE PRACTITIONER

## 2024-05-10 PROCEDURE — 85025 COMPLETE CBC W/AUTO DIFF WBC: CPT | Performed by: NURSE PRACTITIONER

## 2024-05-10 PROCEDURE — 99213 OFFICE O/P EST LOW 20 MIN: CPT | Mod: S$GLB,,, | Performed by: NURSE PRACTITIONER

## 2024-05-10 PROCEDURE — 36415 COLL VENOUS BLD VENIPUNCTURE: CPT | Mod: PN | Performed by: NURSE PRACTITIONER

## 2024-05-10 PROCEDURE — 1159F MED LIST DOCD IN RCRD: CPT | Mod: CPTII,S$GLB,, | Performed by: NURSE PRACTITIONER

## 2024-05-10 PROCEDURE — 3078F DIAST BP <80 MM HG: CPT | Mod: CPTII,S$GLB,, | Performed by: NURSE PRACTITIONER

## 2024-05-10 PROCEDURE — 80053 COMPREHEN METABOLIC PANEL: CPT | Performed by: NURSE PRACTITIONER

## 2024-05-10 PROCEDURE — 84153 ASSAY OF PSA TOTAL: CPT | Performed by: NURSE PRACTITIONER

## 2024-05-10 RX ORDER — AMOXICILLIN AND CLAVULANATE POTASSIUM 875; 125 MG/1; MG/1
1 TABLET, FILM COATED ORAL EVERY 12 HOURS
Qty: 14 TABLET | Refills: 0 | Status: SHIPPED | OUTPATIENT
Start: 2024-05-10 | End: 2024-05-17

## 2024-05-10 NOTE — PROGRESS NOTES
HPI     Chief Complaint  Chief Complaint   Patient presents with    Cystitis       HPI  Clinton Combs is a 34 y.o. male with multiple medical diagnoses as listed in the medical history and problem list that presents for UTI symptoms. This patient is new to me.    UTI symptoms: Visited [facility] approx a month ago where he was prescribe Keflex  for treatment of acute cystitis. Symptoms subsided after treatment. Revisited the same urgent care approx a week ago for repeat urine screening given dysuria with mild urinary frequency returned. Today he complains of dysuria that is constant however, dysuria is worse when urinating. Endorses urinary frequency without urgency. Denies hematuria. Denies systemic symptomology. Not currently sexually active. Chlamydia, Gonn and Trich were all negative a week ago after urgent care visit. No new medications.     History     PAST MEDICAL HISTORY:  Past Medical History:   Diagnosis Date    ELE positive        PAST SURGICAL HISTORY:  Past Surgical History:   Procedure Laterality Date    back injections         SOCIAL HISTORY:  Social History     Socioeconomic History    Marital status: Single    Number of children: 0   Occupational History     Comment:     Tobacco Use    Smoking status: Never    Smokeless tobacco: Never   Substance and Sexual Activity    Alcohol use: Yes    Drug use: Never    Sexual activity: Not Currently       FAMILY HISTORY:  Family History   Problem Relation Name Age of Onset    Diabetes Father          prediabetes     Alzheimer's disease Maternal Grandmother      Heart attack Maternal Grandfather         ALLERGIES AND MEDICATIONS: updated and reviewed.  Review of patient's allergies indicates:  No Known Allergies  Current Outpatient Medications   Medication Sig Dispense Refill    amoxicillin-clavulanate 875-125mg (AUGMENTIN) 875-125 mg per tablet Take 1 tablet by mouth every 12 (twelve) hours. for 7 days 14 tablet 0     "lisdexamfetamine (VYVANSE) 10 mg Cap Take 10 mg by mouth once daily. 30 capsule 0    methocarbamoL (ROBAXIN) 750 MG Tab Take 750 mg by mouth every evening.       No current facility-administered medications for this visit.       Exam     ROS  Review of Systems   Constitutional:  Negative for appetite change, chills, fatigue and fever.   HENT:  Negative for congestion, ear pain, postnasal drip, rhinorrhea, sneezing, sore throat and tinnitus.    Respiratory:  Negative for cough and shortness of breath.    Cardiovascular:  Negative for chest pain and palpitations.   Gastrointestinal:  Negative for abdominal pain, constipation, diarrhea, nausea and vomiting.   Genitourinary:  Positive for dysuria and frequency. Negative for difficulty urinating.   Musculoskeletal:  Negative for arthralgias.   Neurological:  Negative for headaches.   Psychiatric/Behavioral:  Negative for sleep disturbance.            Physical Exam  Vitals:    05/10/24 0749   BP: 126/70   Pulse: 89   Temp: 98 °F (36.7 °C)   SpO2: 98%   Weight: 76 kg (167 lb 8.8 oz)   Height: 5' 10" (1.778 m)    Body mass index is 24.04 kg/m².  Weight: 76 kg (167 lb 8.8 oz)   Height: 5' 10" (177.8 cm)   Physical Exam  Constitutional:       General: He is not in acute distress.     Appearance: Normal appearance. He is well-developed.   HENT:      Head: Normocephalic and atraumatic.      Right Ear: External ear normal.      Left Ear: External ear normal.      Nose: Nose normal.      Mouth/Throat:      Pharynx: No oropharyngeal exudate.   Eyes:      Pupils: Pupils are equal, round, and reactive to light.   Cardiovascular:      Rate and Rhythm: Normal rate and regular rhythm.      Pulses: Normal pulses.      Heart sounds: No murmur heard.     No friction rub. No gallop.   Pulmonary:      Effort: Pulmonary effort is normal. No respiratory distress.      Breath sounds: Normal breath sounds. No wheezing.   Abdominal:      General: Bowel sounds are normal.      Palpations: Abdomen " is soft.   Musculoskeletal:      Cervical back: Neck supple.   Lymphadenopathy:      Cervical: No cervical adenopathy.   Skin:     General: Skin is warm and dry.   Neurological:      Mental Status: He is alert and oriented to person, place, and time.   Psychiatric:         Mood and Affect: Mood normal.           Health Maintenance         Date Due Completion Date    Hepatitis C Screening Never done ---    HIV Screening Never done ---    COVID-19 Vaccine (4 - 2023-24 season) 09/01/2023 12/13/2021    TETANUS VACCINE 04/01/2029 4/1/2019            Assessment & Plan     Assessment & Plan  Problem List Items Addressed This Visit          Psychiatric    Anxiety     Other Visit Diagnoses       UTI symptoms    -  Primary  - Augmentin prophylaxis given reported symptoms    Relevant Medications    amoxicillin-clavulanate 875-125mg (AUGMENTIN) 875-125 mg per tablet    Other Relevant Orders    Urinalysis, Reflex to Urine Culture Urine, Clean Catch    CBC Auto Differential    Comprehensive Metabolic Panel    PSA, Screening    ADHD (attention deficit hyperactivity disorder), inattentive type                  Health Maintenance reviewed: Deferred at this time.    Follow-up: 1. Non-fasting labs today BBS    30+ minutes of total time spent on the encounter, which includes face to face time and non-face to face time preparing to see the patient (eg, review of tests), Obtaining and/or reviewing separately obtained history, documenting clinical information in the electronic or other health record, independently interpreting results (not separately reported) and communicating results to the patient/family/caregiver, or Care coordination (not separately reported).      Sincerely,  Alcides Monk NP

## 2024-05-30 ENCOUNTER — OFFICE VISIT (OUTPATIENT)
Dept: PRIMARY CARE CLINIC | Facility: CLINIC | Age: 34
End: 2024-05-30
Payer: COMMERCIAL

## 2024-05-30 ENCOUNTER — OFFICE VISIT (OUTPATIENT)
Dept: UROLOGY | Facility: CLINIC | Age: 34
End: 2024-05-30
Payer: COMMERCIAL

## 2024-05-30 ENCOUNTER — LAB VISIT (OUTPATIENT)
Dept: LAB | Facility: HOSPITAL | Age: 34
End: 2024-05-30
Attending: NURSE PRACTITIONER
Payer: COMMERCIAL

## 2024-05-30 VITALS
HEIGHT: 70 IN | BODY MASS INDEX: 23.64 KG/M2 | DIASTOLIC BLOOD PRESSURE: 85 MMHG | RESPIRATION RATE: 14 BRPM | WEIGHT: 165.13 LBS | SYSTOLIC BLOOD PRESSURE: 134 MMHG | HEART RATE: 79 BPM

## 2024-05-30 VITALS
SYSTOLIC BLOOD PRESSURE: 124 MMHG | DIASTOLIC BLOOD PRESSURE: 80 MMHG | HEIGHT: 70 IN | TEMPERATURE: 98 F | OXYGEN SATURATION: 97 % | BODY MASS INDEX: 24.05 KG/M2 | WEIGHT: 168 LBS | HEART RATE: 80 BPM

## 2024-05-30 DIAGNOSIS — M54.50 CHRONIC LEFT-SIDED LOW BACK PAIN WITHOUT SCIATICA: ICD-10-CM

## 2024-05-30 DIAGNOSIS — N34.3 URETHRITIS, NOT SEXUALLY TRANSMITTED: Primary | ICD-10-CM

## 2024-05-30 DIAGNOSIS — N36.8 PAIN IN URETHRAL MEATUS: ICD-10-CM

## 2024-05-30 DIAGNOSIS — R30.0 DYSURIA: ICD-10-CM

## 2024-05-30 DIAGNOSIS — N34.3 URETHRITIS, NOT SEXUALLY TRANSMITTED: ICD-10-CM

## 2024-05-30 DIAGNOSIS — G89.29 CHRONIC LEFT-SIDED LOW BACK PAIN WITHOUT SCIATICA: ICD-10-CM

## 2024-05-30 DIAGNOSIS — N34.1 URETHRITIS, NOT SEXUALLY TRANSMITTED: ICD-10-CM

## 2024-05-30 DIAGNOSIS — N34.1 URETHRITIS, NOT SEXUALLY TRANSMITTED: Primary | ICD-10-CM

## 2024-05-30 DIAGNOSIS — N41.9 PROSTATITIS, UNSPECIFIED PROSTATITIS TYPE: ICD-10-CM

## 2024-05-30 LAB
BACTERIA #/AREA URNS AUTO: NORMAL /HPF
BILIRUB UR QL STRIP: NEGATIVE
CLARITY UR REFRACT.AUTO: ABNORMAL
COLOR UR AUTO: ABNORMAL
GLUCOSE UR QL STRIP: NEGATIVE
HGB UR QL STRIP: NEGATIVE
KETONES UR QL STRIP: NEGATIVE
LEUKOCYTE ESTERASE UR QL STRIP: NEGATIVE
MICROSCOPIC COMMENT: NORMAL
NITRITE UR QL STRIP: NEGATIVE
PH UR STRIP: 6 [PH] (ref 5–8)
PROT UR QL STRIP: NEGATIVE
SP GR UR STRIP: 1.02 (ref 1–1.03)
URN SPEC COLLECT METH UR: ABNORMAL
WBC #/AREA URNS AUTO: 2 /HPF (ref 0–5)

## 2024-05-30 PROCEDURE — 99999 PR PBB SHADOW E&M-EST. PATIENT-LVL IV: CPT | Mod: PBBFAC,,, | Performed by: UROLOGY

## 2024-05-30 PROCEDURE — 3008F BODY MASS INDEX DOCD: CPT | Mod: CPTII,S$GLB,, | Performed by: NURSE PRACTITIONER

## 2024-05-30 PROCEDURE — 1159F MED LIST DOCD IN RCRD: CPT | Mod: CPTII,S$GLB,, | Performed by: UROLOGY

## 2024-05-30 PROCEDURE — 99213 OFFICE O/P EST LOW 20 MIN: CPT | Mod: S$GLB,,, | Performed by: NURSE PRACTITIONER

## 2024-05-30 PROCEDURE — 1160F RVW MEDS BY RX/DR IN RCRD: CPT | Mod: CPTII,S$GLB,, | Performed by: UROLOGY

## 2024-05-30 PROCEDURE — 3074F SYST BP LT 130 MM HG: CPT | Mod: CPTII,S$GLB,, | Performed by: NURSE PRACTITIONER

## 2024-05-30 PROCEDURE — 3079F DIAST BP 80-89 MM HG: CPT | Mod: CPTII,S$GLB,, | Performed by: UROLOGY

## 2024-05-30 PROCEDURE — 1160F RVW MEDS BY RX/DR IN RCRD: CPT | Mod: CPTII,S$GLB,, | Performed by: NURSE PRACTITIONER

## 2024-05-30 PROCEDURE — 3008F BODY MASS INDEX DOCD: CPT | Mod: CPTII,S$GLB,, | Performed by: UROLOGY

## 2024-05-30 PROCEDURE — 99999 PR PBB SHADOW E&M-EST. PATIENT-LVL IV: CPT | Mod: PBBFAC,,, | Performed by: NURSE PRACTITIONER

## 2024-05-30 PROCEDURE — 99204 OFFICE O/P NEW MOD 45 MIN: CPT | Mod: S$GLB,,, | Performed by: UROLOGY

## 2024-05-30 PROCEDURE — 3075F SYST BP GE 130 - 139MM HG: CPT | Mod: CPTII,S$GLB,, | Performed by: UROLOGY

## 2024-05-30 PROCEDURE — 1159F MED LIST DOCD IN RCRD: CPT | Mod: CPTII,S$GLB,, | Performed by: NURSE PRACTITIONER

## 2024-05-30 PROCEDURE — 81001 URINALYSIS AUTO W/SCOPE: CPT | Performed by: NURSE PRACTITIONER

## 2024-05-30 PROCEDURE — 3079F DIAST BP 80-89 MM HG: CPT | Mod: CPTII,S$GLB,, | Performed by: NURSE PRACTITIONER

## 2024-05-30 RX ORDER — DOXYCYCLINE 100 MG/1
100 CAPSULE ORAL 2 TIMES DAILY
Qty: 60 CAPSULE | Refills: 0 | Status: SHIPPED | OUTPATIENT
Start: 2024-05-30 | End: 2024-06-29

## 2024-05-30 RX ORDER — DOXYCYCLINE 100 MG/1
100 CAPSULE ORAL 2 TIMES DAILY
Qty: 14 CAPSULE | Refills: 0 | Status: SHIPPED | OUTPATIENT
Start: 2024-05-30 | End: 2024-05-30 | Stop reason: SDUPTHER

## 2024-05-30 RX ORDER — DICLOFENAC SODIUM 75 MG/1
75 TABLET, DELAYED RELEASE ORAL 2 TIMES DAILY PRN
COMMUNITY
Start: 2024-04-08

## 2024-05-30 RX ORDER — ALFUZOSIN HYDROCHLORIDE 10 MG/1
10 TABLET, EXTENDED RELEASE ORAL
Qty: 30 TABLET | Refills: 11 | Status: SHIPPED | OUTPATIENT
Start: 2024-05-30 | End: 2025-05-30

## 2024-05-30 NOTE — PROGRESS NOTES
Chief Complaint:   Encounter Diagnoses   Name Primary?    Prostatitis, unspecified prostatitis type     Dysuria     Pain in urethral meatus        HPI:  HPI Clinton Combs jerry 34 y.o. male who presents with complaints of irritation and discomfort at the tip of his penis.  Patient was states initially about a year ago he was diagnosed with a UTI treated with antibiotics and got better.  He was recently seen in April at another urgent care and was told he had another UTI was prescribed Keflex got better but got worse again.  He then saw primary care who prescribed amoxicillin which also improved his symptoms but quickly relapsed and he was recently seen today by PCP and referred to us for evaluation.  Patient denies any over-the-counter medicines.  He has no history of being dehydrated.  He does have a standing desk at work and does not sit for extended periods of time.  He has noticed his stream is slower with more hesitancy and dribbling.  He was also having to go more frequently.    History:  Social History     Tobacco Use    Smoking status: Never    Smokeless tobacco: Never   Substance Use Topics    Alcohol use: Yes    Drug use: Never     Past Medical History:   Diagnosis Date    ELE positive      Past Surgical History:   Procedure Laterality Date    back injections       Family History   Problem Relation Name Age of Onset    Diabetes Father          prediabetes     Alzheimer's disease Maternal Grandmother      Heart attack Maternal Grandfather         Current Outpatient Medications on File Prior to Visit   Medication Sig Dispense Refill    diclofenac (VOLTAREN) 75 MG EC tablet Take 75 mg by mouth 2 (two) times daily as needed.      lisdexamfetamine (VYVANSE) 10 mg Cap Take 10 mg by mouth once daily. 30 capsule 0    methocarbamoL (ROBAXIN) 750 MG Tab Take 750 mg by mouth every evening.      [DISCONTINUED] doxycycline (VIBRAMYCIN) 100 MG Cap Take 1 capsule (100 mg total) by mouth 2 (two) times daily. for 7 days 14  "capsule 0     No current facility-administered medications on file prior to visit.        Objective:     Vitals:    05/30/24 1515   BP: 134/85   BP Location: Left arm   Patient Position: Sitting   Pulse: 79   Resp: 14   Weight: 74.9 kg (165 lb 2 oz)   Height: 5' 10" (1.778 m)      BMI Readings from Last 1 Encounters:   05/30/24 23.69 kg/m²          Physical Exam  No acute distress alert and oriented   Respirations even unlabored   Abdomen is soft nontender  Lab Results   Component Value Date    PSA 0.51 05/10/2024        Lab Results   Component Value Date    CREATININE 1.0 05/10/2024      Assessment:       1. Prostatitis, unspecified prostatitis type    2. Dysuria    3. Pain in urethral meatus        Plan:     1. Prostatitis, unspecified prostatitis type    2. Dysuria    3. Pain in urethral meatus       Orders Placed This Encounter    alfuzosin (UROXATRAL) 10 mg Tb24    doxycycline (VIBRAMYCIN) 100 MG Cap      Had an extensive discussion with him regarding likely diagnosis of prostatitis and triggers.  Recommend combination treatment with anti-inflammatories alpha-blocker and antibiotics.  I will see him back in 6 weeks.  He understands if no improvement may need further evaluation.  "

## 2024-05-30 NOTE — PROGRESS NOTES
HPI     Chief Complaint  Chief Complaint   Patient presents with    Groin Pain     Itching, burning, swelling on occasions. Stated was treated but no relief with groin area       HPI  Clinton Combs is a 34 y.o. male with multiple medical diagnoses as listed in the medical history and problem list that presents for Urethritis. This patient is known to me with a past visit.    Urethritis: No sexual intercourse in approx one year. Does masturbate periodically. Urination is painful at the ending voiding. Swelling at the tip of the penis once. Also experiencing painful ejaculations. Experienced two recent UTIs where he was treated with ABX. Symptoms decrease during treatment however, symptoms worsen after treatment. Denies penile or meatal swelling, no swelling of testicles, denies discharge or blood in urine.    History     PAST MEDICAL HISTORY:  Past Medical History:   Diagnosis Date    ELE positive        PAST SURGICAL HISTORY:  Past Surgical History:   Procedure Laterality Date    back injections         SOCIAL HISTORY:  Social History     Socioeconomic History    Marital status: Single    Number of children: 0   Occupational History     Comment:     Tobacco Use    Smoking status: Never    Smokeless tobacco: Never   Substance and Sexual Activity    Alcohol use: Yes    Drug use: Never    Sexual activity: Not Currently       FAMILY HISTORY:  Family History   Problem Relation Name Age of Onset    Diabetes Father          prediabetes     Alzheimer's disease Maternal Grandmother      Heart attack Maternal Grandfather         ALLERGIES AND MEDICATIONS: updated and reviewed.  Review of patient's allergies indicates:  No Known Allergies  Current Outpatient Medications   Medication Sig Dispense Refill    diclofenac (VOLTAREN) 75 MG EC tablet Take 75 mg by mouth 2 (two) times daily as needed.      lisdexamfetamine (VYVANSE) 10 mg Cap Take 10 mg by mouth once daily. 30 capsule 0    methocarbamoL (ROBAXIN) 750  "MG Tab Take 750 mg by mouth every evening.      doxycycline (VIBRAMYCIN) 100 MG Cap Take 1 capsule (100 mg total) by mouth 2 (two) times daily. for 7 days 14 capsule 0     No current facility-administered medications for this visit.       Exam     ROS  Review of Systems   Constitutional:  Negative for appetite change, chills, fatigue and fever.   HENT:  Negative for congestion, ear pain, postnasal drip, rhinorrhea, sneezing, sore throat and tinnitus.    Respiratory:  Negative for cough and shortness of breath.    Cardiovascular:  Negative for chest pain and palpitations.   Gastrointestinal:  Negative for abdominal pain, constipation, diarrhea, nausea and vomiting.   Genitourinary:  Positive for dysuria and penile pain. Negative for difficulty urinating.   Musculoskeletal:  Negative for arthralgias.   Neurological:  Negative for headaches.   Psychiatric/Behavioral:  Negative for sleep disturbance.            Physical Exam  Vitals:    05/30/24 1302   BP: 124/80   Pulse: 80   Temp: 97.5 °F (36.4 °C)   SpO2: 97%   Weight: 76.2 kg (167 lb 15.9 oz)   Height: 5' 10" (1.778 m)    Body mass index is 24.1 kg/m².  Weight: 76.2 kg (167 lb 15.9 oz)   Height: 5' 10" (177.8 cm)   Physical Exam  Constitutional:       General: He is not in acute distress.     Appearance: Normal appearance. He is well-developed.   HENT:      Head: Normocephalic and atraumatic.      Right Ear: External ear normal.      Left Ear: External ear normal.      Nose: Nose normal.      Mouth/Throat:      Pharynx: No oropharyngeal exudate.   Eyes:      Pupils: Pupils are equal, round, and reactive to light.   Cardiovascular:      Rate and Rhythm: Normal rate and regular rhythm.      Heart sounds: No murmur heard.     No friction rub. No gallop.   Pulmonary:      Effort: Pulmonary effort is normal. No respiratory distress.      Breath sounds: Normal breath sounds. No wheezing.   Abdominal:      General: Bowel sounds are normal.      Palpations: Abdomen is soft. "   Musculoskeletal:      Cervical back: Neck supple.   Lymphadenopathy:      Cervical: No cervical adenopathy.   Skin:     General: Skin is warm and dry.   Neurological:      Mental Status: He is alert and oriented to person, place, and time.   Psychiatric:         Mood and Affect: Mood is anxious.           Health Maintenance         Date Due Completion Date    Hepatitis C Screening Never done ---    HIV Screening Never done ---    COVID-19 Vaccine (4 - 2023-24 season) 09/01/2023 12/13/2021    TETANUS VACCINE 04/01/2029 4/1/2019            Assessment & Plan     Assessment & Plan  Problem List Items Addressed This Visit          Orthopedic    Low back pain    Overview     October 2015 IMO Update          Other Visit Diagnoses       Urethritis, not sexually transmitted    -  Primary    Relevant Medications    doxycycline (VIBRAMYCIN) 100 MG Cap    Other Relevant Orders    Ambulatory referral/consult to Urology    C. trachomatis/N. gonorrhoeae by AMP DNA    Urinalysis, Reflex to Urine Culture Urine, Clean Catch    Dysuria        Relevant Medications    doxycycline (VIBRAMYCIN) 100 MG Cap    Other Relevant Orders    Ambulatory referral/consult to Urology    Urinalysis, Reflex to Urine Culture Urine, Clean Catch    Pain in urethral meatus        Relevant Medications    doxycycline (VIBRAMYCIN) 100 MG Cap    Other Relevant Orders    Ambulatory referral/consult to Urology    C. trachomatis/N. gonorrhoeae by AMP DNA    Urinalysis, Reflex to Urine Culture Urine, Clean Catch              Health Maintenance reviewed: Deferred at this time.    Follow-up: 1. Labs today at [facility]  2. Schedule with Urology    20+ minutes of total time spent on the encounter, which includes face to face time and non-face to face time preparing to see the patient (eg, review of tests), Obtaining and/or reviewing separately obtained history, documenting clinical information in the electronic or other health record, independently interpreting results  (not separately reported) and communicating results to the patient/family/caregiver, or Care coordination (not separately reported).      Sincerely,  Alcides Monk, NP

## 2024-07-11 ENCOUNTER — OFFICE VISIT (OUTPATIENT)
Dept: UROLOGY | Facility: CLINIC | Age: 34
End: 2024-07-11
Payer: COMMERCIAL

## 2024-07-11 VITALS — TEMPERATURE: 98 F | HEIGHT: 70 IN | BODY MASS INDEX: 23.64 KG/M2 | WEIGHT: 165.13 LBS

## 2024-07-11 DIAGNOSIS — N41.9 PROSTATITIS, UNSPECIFIED PROSTATITIS TYPE: Primary | ICD-10-CM

## 2024-07-11 DIAGNOSIS — R30.0 DYSURIA: ICD-10-CM

## 2024-07-11 DIAGNOSIS — N36.8 PAIN IN URETHRAL MEATUS: ICD-10-CM

## 2024-07-11 PROCEDURE — 3008F BODY MASS INDEX DOCD: CPT | Mod: CPTII,S$GLB,, | Performed by: UROLOGY

## 2024-07-11 PROCEDURE — 1160F RVW MEDS BY RX/DR IN RCRD: CPT | Mod: CPTII,S$GLB,, | Performed by: UROLOGY

## 2024-07-11 PROCEDURE — 99214 OFFICE O/P EST MOD 30 MIN: CPT | Mod: S$GLB,,, | Performed by: UROLOGY

## 2024-07-11 PROCEDURE — 99999 PR PBB SHADOW E&M-EST. PATIENT-LVL III: CPT | Mod: PBBFAC,,, | Performed by: UROLOGY

## 2024-07-11 PROCEDURE — 1159F MED LIST DOCD IN RCRD: CPT | Mod: CPTII,S$GLB,, | Performed by: UROLOGY

## 2024-07-11 RX ORDER — CIPROFLOXACIN 500 MG/1
500 TABLET ORAL EVERY 12 HOURS
Qty: 60 TABLET | Refills: 0 | Status: SHIPPED | OUTPATIENT
Start: 2024-07-11

## 2024-07-11 NOTE — PROGRESS NOTES
"Chief Complaint:   Encounter Diagnoses   Name Primary?    Prostatitis, unspecified prostatitis type Yes    Dysuria     Pain in urethral meatus        HPI:  HPI Clinton Combs jerry 34 y.o. male who presents with follow up to prostatitis.  He was started on doxycycline and alfuzosin and taking an anti-inflammatory.  He states his symptoms are much improved but maybe 50-60% only.  He states when he took the alfuzosin for 30 days he noticed improvements in his urinary stream, emptying and dribbling.  He was still has some pain in his penis at intermittently daily on the left side.  He wanted to avoid a prostate exam at last visit today he is agreeing to proceed.    History:  Social History     Tobacco Use    Smoking status: Never    Smokeless tobacco: Never   Substance Use Topics    Alcohol use: Yes    Drug use: Never     Past Medical History:   Diagnosis Date    ELE positive      Past Surgical History:   Procedure Laterality Date    back injections       Family History   Problem Relation Name Age of Onset    Diabetes Father          prediabetes     Alzheimer's disease Maternal Grandmother      Heart attack Maternal Grandfather         Current Outpatient Medications on File Prior to Visit   Medication Sig Dispense Refill    alfuzosin (UROXATRAL) 10 mg Tb24 Take 1 tablet (10 mg total) by mouth daily with breakfast. (Patient not taking: Reported on 7/11/2024) 30 tablet 11    diclofenac (VOLTAREN) 75 MG EC tablet Take 75 mg by mouth 2 (two) times daily as needed.      lisdexamfetamine (VYVANSE) 10 mg Cap Take 10 mg by mouth once daily. 30 capsule 0    methocarbamoL (ROBAXIN) 750 MG Tab Take 750 mg by mouth every evening.       No current facility-administered medications on file prior to visit.        Objective:     Vitals:    07/11/24 1500   Temp: 97.6 °F (36.4 °C)   Weight: 74.9 kg (165 lb 2 oz)   Height: 5' 10" (1.778 m)      BMI Readings from Last 1 Encounters:   07/11/24 23.69 kg/m²          Physical Exam  No acute " distress alert and oriented   Respirations even unlabored   Abdomen is soft nontender   Digital rectal exam reveals a 40 g slightly soft prostate.  Lab Results   Component Value Date    PSA 0.51 05/10/2024        Lab Results   Component Value Date    CREATININE 1.0 05/10/2024      Assessment:       1. Prostatitis, unspecified prostatitis type    2. Dysuria    3. Pain in urethral meatus        Plan:     1. Prostatitis, unspecified prostatitis type    2. Dysuria    3. Pain in urethral meatus       Orders Placed This Encounter    ciprofloxacin HCl (CIPRO) 500 MG tablet      Enlarged boggy prostate consistent with persistent prostatitis.  This along with symptoms.  Recommend restart antibiotics with ciprofloxacin for 30 days.  We will restart alfuzosin.  Patient will continue diclofenac.  I will see him back in 8-10 weeks to see how he is doing.  Once again discussed prostatitis triggers.

## 2024-09-12 ENCOUNTER — OFFICE VISIT (OUTPATIENT)
Dept: UROLOGY | Facility: CLINIC | Age: 34
End: 2024-09-12
Payer: COMMERCIAL

## 2024-09-12 VITALS — WEIGHT: 165.13 LBS | HEIGHT: 70 IN | BODY MASS INDEX: 23.64 KG/M2

## 2024-09-12 DIAGNOSIS — N41.9 PROSTATITIS, UNSPECIFIED PROSTATITIS TYPE: Primary | ICD-10-CM

## 2024-09-12 DIAGNOSIS — R39.198 SLOW URINARY STREAM: ICD-10-CM

## 2024-09-12 LAB
BILIRUB UR QL STRIP: NEGATIVE
GLUCOSE UR QL STRIP: NEGATIVE
KETONES UR QL STRIP: NEGATIVE
LEUKOCYTE ESTERASE UR QL STRIP: NEGATIVE
PH, POC UA: 6
POC BLOOD, URINE: NEGATIVE
POC NITRATES, URINE: NEGATIVE
POC RESIDUAL URINE VOLUME: 21 ML (ref 0–100)
PROT UR QL STRIP: NEGATIVE
SP GR UR STRIP: 1.02 (ref 1–1.03)
UROBILINOGEN UR STRIP-ACNC: 0.2 (ref 0.3–2.2)

## 2024-09-12 PROCEDURE — 51798 US URINE CAPACITY MEASURE: CPT | Mod: S$GLB,,, | Performed by: UROLOGY

## 2024-09-12 PROCEDURE — 99214 OFFICE O/P EST MOD 30 MIN: CPT | Mod: S$GLB,,, | Performed by: UROLOGY

## 2024-09-12 PROCEDURE — 1159F MED LIST DOCD IN RCRD: CPT | Mod: CPTII,S$GLB,, | Performed by: UROLOGY

## 2024-09-12 PROCEDURE — 81003 URINALYSIS AUTO W/O SCOPE: CPT | Mod: QW,S$GLB,, | Performed by: UROLOGY

## 2024-09-12 PROCEDURE — 3008F BODY MASS INDEX DOCD: CPT | Mod: CPTII,S$GLB,, | Performed by: UROLOGY

## 2024-09-12 PROCEDURE — 99999 PR PBB SHADOW E&M-EST. PATIENT-LVL III: CPT | Mod: PBBFAC,,, | Performed by: UROLOGY

## 2024-09-12 RX ORDER — ALFUZOSIN HYDROCHLORIDE 10 MG/1
10 TABLET, EXTENDED RELEASE ORAL
Qty: 30 TABLET | Refills: 5 | Status: SHIPPED | OUTPATIENT
Start: 2024-09-12 | End: 2025-03-11

## 2024-09-12 NOTE — PROGRESS NOTES
"Chief Complaint:   Encounter Diagnoses   Name Primary?    Prostatitis, unspecified prostatitis type Yes    Slow urinary stream        HPI:  HPI Clinton Combs jerry 34 y.o. male who presents with follow up of prostatitis.  He has been on 2 rounds of antibiotics.  He does state that he noticed improvements in his voiding when he was on alfuzosin.  He did have a digital rectal exam showing a boggy enlarged prostate last time.  He was asked I will go and get him ready.  He states overall he is improved from when he initially was referred here.  He does have some intermittent left groin pain and sharp pains.  History:  Social History     Tobacco Use    Smoking status: Never    Smokeless tobacco: Never   Substance Use Topics    Alcohol use: Yes    Drug use: Never     Past Medical History:   Diagnosis Date    ELE positive      Past Surgical History:   Procedure Laterality Date    back injections       Family History   Problem Relation Name Age of Onset    Diabetes Father          prediabetes     Alzheimer's disease Maternal Grandmother      Heart attack Maternal Grandfather         Current Outpatient Medications on File Prior to Visit   Medication Sig Dispense Refill    ciprofloxacin HCl (CIPRO) 500 MG tablet Take 1 tablet (500 mg total) by mouth every 12 (twelve) hours. 60 tablet 0    diclofenac (VOLTAREN) 75 MG EC tablet Take 75 mg by mouth 2 (two) times daily as needed.      lisdexamfetamine (VYVANSE) 10 mg Cap Take 10 mg by mouth once daily. 30 capsule 0    [DISCONTINUED] alfuzosin (UROXATRAL) 10 mg Tb24 Take 1 tablet (10 mg total) by mouth daily with breakfast. 30 tablet 11    [DISCONTINUED] methocarbamoL (ROBAXIN) 750 MG Tab Take 750 mg by mouth every evening.       No current facility-administered medications on file prior to visit.        Objective:     Vitals:    09/12/24 1340   Weight: 74.9 kg (165 lb 2 oz)   Height: 5' 10" (1.778 m)      BMI Readings from Last 1 Encounters:   09/12/24 23.69 kg/m²    "       Physical Exam  No acute distress alert and oriented   Respirations even unlabored      Lab Results   Component Value Date    PSA 0.51 05/10/2024        Lab Results   Component Value Date    CREATININE 1.0 05/10/2024      Assessment:       1. Prostatitis, unspecified prostatitis type    2. Slow urinary stream        Plan:     1. Prostatitis, unspecified prostatitis type    2. Slow urinary stream       Orders Placed This Encounter    POCT Urinalysis, Dipstick, Automated, W/O Scope    POCT Bladder Scan    alfuzosin (UROXATRAL) 10 mg Tb24        Discussed discontinuing muscle relaxers.  Discussed triggers for prostatitis.  Discussed behavioral changes.  He was plan continue alfuzosin indefinitely.  We will re-evaluate in 6 months.

## 2024-11-24 DIAGNOSIS — F90.0 ADHD (ATTENTION DEFICIT HYPERACTIVITY DISORDER), INATTENTIVE TYPE: ICD-10-CM

## 2024-11-24 NOTE — TELEPHONE ENCOUNTER
No care due was identified.  [facility] Embedded Care Due Messages. Reference number: [id number].   11/24/2024 11:59:08 AM CST

## 2024-11-25 ENCOUNTER — PATIENT MESSAGE (OUTPATIENT)
Dept: PRIMARY CARE CLINIC | Facility: CLINIC | Age: 34
End: 2024-11-25
Payer: COMMERCIAL

## 2024-11-25 RX ORDER — LISDEXAMFETAMINE DIMESYLATE 10 MG/1
10 CAPSULE ORAL DAILY
Qty: 30 CAPSULE | Refills: 0 | OUTPATIENT
Start: 2024-11-25

## 2024-11-27 ENCOUNTER — OFFICE VISIT (OUTPATIENT)
Dept: PRIMARY CARE CLINIC | Facility: CLINIC | Age: 34
End: 2024-11-27
Payer: COMMERCIAL

## 2024-11-27 VITALS
DIASTOLIC BLOOD PRESSURE: 74 MMHG | TEMPERATURE: 98 F | BODY MASS INDEX: 25.55 KG/M2 | HEART RATE: 76 BPM | OXYGEN SATURATION: 98 % | WEIGHT: 178.44 LBS | HEIGHT: 70 IN | RESPIRATION RATE: 18 BRPM | SYSTOLIC BLOOD PRESSURE: 124 MMHG

## 2024-11-27 DIAGNOSIS — M51.370 DEGENERATION OF INTERVERTEBRAL DISC OF LUMBOSACRAL REGION WITH DISCOGENIC BACK PAIN: ICD-10-CM

## 2024-11-27 DIAGNOSIS — F41.9 ANXIETY: ICD-10-CM

## 2024-11-27 DIAGNOSIS — I47.9 TACHYCARDIA, PAROXYSMAL: ICD-10-CM

## 2024-11-27 DIAGNOSIS — Z00.00 ENCOUNTER FOR PREVENTATIVE ADULT HEALTH CARE EXAMINATION: Primary | ICD-10-CM

## 2024-11-27 DIAGNOSIS — Z23 NEEDS FLU SHOT: ICD-10-CM

## 2024-11-27 DIAGNOSIS — F90.0 ADHD (ATTENTION DEFICIT HYPERACTIVITY DISORDER), INATTENTIVE TYPE: ICD-10-CM

## 2024-11-27 PROBLEM — F32.0 CURRENT MILD EPISODE OF MAJOR DEPRESSIVE DISORDER WITHOUT PRIOR EPISODE: Status: ACTIVE | Noted: 2024-11-27

## 2024-11-27 PROCEDURE — 90471 IMMUNIZATION ADMIN: CPT | Mod: S$GLB,,, | Performed by: NURSE PRACTITIONER

## 2024-11-27 PROCEDURE — 90656 IIV3 VACC NO PRSV 0.5 ML IM: CPT | Mod: S$GLB,,, | Performed by: NURSE PRACTITIONER

## 2024-11-27 PROCEDURE — 3074F SYST BP LT 130 MM HG: CPT | Mod: CPTII,S$GLB,, | Performed by: NURSE PRACTITIONER

## 2024-11-27 PROCEDURE — 99999 PR PBB SHADOW E&M-EST. PATIENT-LVL IV: CPT | Mod: PBBFAC,,, | Performed by: NURSE PRACTITIONER

## 2024-11-27 PROCEDURE — 3078F DIAST BP <80 MM HG: CPT | Mod: CPTII,S$GLB,, | Performed by: NURSE PRACTITIONER

## 2024-11-27 PROCEDURE — 3008F BODY MASS INDEX DOCD: CPT | Mod: CPTII,S$GLB,, | Performed by: NURSE PRACTITIONER

## 2024-11-27 PROCEDURE — 1159F MED LIST DOCD IN RCRD: CPT | Mod: CPTII,S$GLB,, | Performed by: NURSE PRACTITIONER

## 2024-11-27 PROCEDURE — 99214 OFFICE O/P EST MOD 30 MIN: CPT | Mod: 25,S$GLB,, | Performed by: NURSE PRACTITIONER

## 2024-11-27 RX ORDER — LISDEXAMFETAMINE DIMESYLATE 10 MG/1
10 CAPSULE ORAL DAILY
Qty: 30 CAPSULE | Refills: 0 | Status: SHIPPED | OUTPATIENT
Start: 2024-11-27

## 2024-11-27 RX ORDER — DICLOFENAC SODIUM 75 MG/1
75 TABLET, DELAYED RELEASE ORAL 2 TIMES DAILY PRN
Qty: 90 TABLET | Refills: 0 | Status: SHIPPED | OUTPATIENT
Start: 2024-11-27

## 2024-11-27 NOTE — PROGRESS NOTES
Chief Complaint  Chief Complaint   Patient presents with    Medication Refill    Annual Exam        History of Present Illness    Mr. Combs presents today for follow-up regarding urological issues and ADHD management.    He reports a history of urethritis, treated with doxycycline and ciprofloxacin. He is currently taking Alfuzosin indefinitely to dilate the urethra. He also has a history of prostatitis with improved but persistent symptoms. A digital rectal exam revealed an enlarged prostate, described as comparable to that of a 60-year-old man, though PSA test was normal. The urologist suggested the prostate inflammation may be idiopathic. He continues to have some urological issues, though significantly less severe than before treatment. He is taking Diclofenac for prostate inflammation.    He was diagnosed with ADHD a few years ago, with his last ADHD management visit in March 2021. He takes Vyvanse once or twice weekly, sometimes taking breaks from the medication. He has been prescribed a low dose medication (possibly 5 or 10 mg) for severe anxiety symptoms, which he uses rarely but reports it helps calm him down and alleviates physical symptoms of anxiety when needed.      ROS:  General: -fever, -chills, -fatigue, -weight gain, -weight loss  Eyes: -vision changes, -redness, -discharge  ENT: -ear pain, -nasal congestion, -sore throat  Cardiovascular: -chest pain, -palpitations, -lower extremity edema  Respiratory: -cough, -shortness of breath  Gastrointestinal: -abdominal pain, -nausea, -vomiting, -diarrhea, -constipation, -blood in stool  Genitourinary: -dysuria, -hematuria, -frequency, +difficulty urinating  Musculoskeletal: -joint pain, -muscle pain  Skin: -rash, -lesion  Neurological: -headache, -dizziness, -numbness, -tingling  Psychiatric: +anxiety, -depression, -sleep difficulty          PAST MEDICAL HISTORY:  Past Medical History:   Diagnosis Date    ELE positive        PAST SURGICAL HISTORY:  Past  Surgical History:   Procedure Laterality Date    back injections         SOCIAL HISTORY:  Social History     Socioeconomic History    Marital status: Single    Number of children: 0   Occupational History     Comment:     Tobacco Use    Smoking status: Never     Passive exposure: Never    Smokeless tobacco: Never   Substance and Sexual Activity    Alcohol use: Yes    Drug use: Never    Sexual activity: Not Currently     Social Drivers of Health     Financial Resource Strain: Low Risk  (11/27/2024)    Overall Financial Resource Strain (CARDIA)     Difficulty of Paying Living Expenses: Not very hard   Food Insecurity: No Food Insecurity (11/27/2024)    Hunger Vital Sign     Worried About Running Out of Food in the Last Year: Never true     Ran Out of Food in the Last Year: Never true   Physical Activity: Sufficiently Active (11/27/2024)    Exercise Vital Sign     Days of Exercise per Week: 4 days     Minutes of Exercise per Session: 40 min   Stress: No Stress Concern Present (11/27/2024)    [employer] [place] [employer] - Occupational Stress Questionnaire     Feeling of Stress : Only a little   Housing Stability: Unknown (11/27/2024)    Housing Stability Vital Sign     Unable to Pay for Housing in the Last Year: No       FAMILY HISTORY:  Family History   Problem Relation Name Age of Onset    Diabetes Father          prediabetes     Alzheimer's disease Maternal Grandmother      Heart attack Maternal Grandfather         ALLERGIES AND MEDICATIONS: updated and reviewed.  Review of patient's allergies indicates:  No Known Allergies  Current Outpatient Medications   Medication Sig Dispense Refill    alfuzosin (UROXATRAL) 10 mg Tb24 Take 1 tablet (10 mg total) by mouth daily with breakfast. 30 tablet 5    ciprofloxacin HCl (CIPRO) 500 MG tablet Take 1 tablet (500 mg total) by mouth every 12 (twelve) hours. 60 tablet 0    diclofenac (VOLTAREN) 75 MG EC tablet Take 1 tablet (75 mg total) by mouth 2  (two) times daily as needed. 90 tablet 0    lisdexamfetamine (VYVANSE) 10 mg Cap Take 1 capsule (10 mg total) by mouth once daily. 30 capsule 0     No current facility-administered medications for this visit.           Physical Exam    General: No acute distress. Well-developed. Well-nourished.  Head: Normocephalic. Atraumatic.  Eyes: EOMI. PERRLA. Conjunctivae non-injected. Sclerae anicteric.  Ears: Bilateral EACs clear. Bilateral TMs clear and intact.  Nose: Nares patent. Normal turbinates. No nasal discharge.  Mouth: Moist oral mucosa. Normal dentition.  Throat: No erythema. No exudate. Uvula midline.  Neck: Supple. Full ROM. Non-tender. No JVD. No carotid bruits. No thyromegaly. No lymphadenopathy.  Cardiovascular: Regular rate. Regular rhythm. No murmurs. No rubs. No gallops. Normal S1, S2. Peripheral pulses 2+ and symmetric.  Respiratory: Normal respiratory effort. Clear to auscultation bilaterally. No rales. No rhonchi. No wheezing.  Abdomen: Soft. Non-tender. Non-distended. Normal bowel sounds. Negative McBurney's. Negative Chadwick's. No rebound. No guarding. No hepatosplenomegaly. No masses.  Musculoskeletal: No  obvious deformity. Normal muscle development. Normal muscle tone. FROM at all joints. No swelling. No tenderness.  Back: No CVA tenderness. No spinal deformity.  Extremities: No cyanosis. No clubbing. No upper extremity edema. No lower extremity edema.  Neurological: Alert & oriented x3. CN II-XII intact. Reflexes 2+ throughout. Strength 5/5 in all extremities. Sensation intact. No slurred speech. Normal gait.  Psychiatric: Normal mood. Normal affect. Good insight. Good judgment. No evidence of suicidal ideation. No evidence of homicidal ideation.  Skin: Warm. Dry. Intact. No rash. No ulcers. No suspicious lesions.        Vitals:    11/27/24 1048   BP: 124/74   BP Location: Left arm   Patient Position: Sitting   Pulse: 76   Resp: 18   Temp: 98 °F (36.7 °C)   TempSrc: Tympanic   SpO2: 98%   Weight:  "81 kg (178 lb 7.4 oz)   Height: 5' 10" (1.778 m)    Body mass index is 25.61 kg/m².  Weight: 81 kg (178 lb 7.4 oz)   Height: 5' 10" (177.8 cm)       Health Maintenance         Date Due Completion Date    Hepatitis C Screening Never done ---    HIV Screening Never done ---    COVID-19 Vaccine (4 - 2024-25 season) 09/01/2024 12/13/2021    TETANUS VACCINE 04/01/2029 4/1/2019    RSV Vaccine (Age 60+ and Pregnant patients) (1 - 1-dose 75+ series) 03/26/2067 ---            Assessment & Plan      IMPRESSION:  -Assessed need for annual exam; determined full workup not necessary due to recent bloodwork in May  -Reviewed history of urethritis and prostatitis; noted improvement with previous antibiotic treatment and current management with alpha-blocker (Alphacin)  -Considered ADHD management; noted intermittent use of Vyvanse  -Evaluated anxiety management; patient using as-needed medication for severe episodes  -Assessed current use of diclofenac for back pain and prostate inflammation as recommended by urologist    CHRONIC PROSTATITIS AND PROSTATE MANAGEMENT:  - Explained alpha-blockers' mechanism of action in dilating arteries in the genitourinary system.  - Discussed possible causes of prostate inflammation, including dehydration and prolonged sitting.  - Continued Alphacin daily for prostate management.  - Continued diclofenac daily for inflammation management.    ATTENTION-DEFICIT HYPERACTIVITY DISORDER:  - Continued Vyvanse as needed, 1-2 times per week for ADHD management.    ANXIETY MANAGEMENT:  - Continued as-needed anxiety medication for severe episodes.    IMMUNIZATION:  - Flu vaccine administered in office.    GENERAL HEALTH SCREENING:  - Ordered CBC, kidney and liver function tests, cholesterol panel, thyroid screening, and urinalysis for March.  - Complete fasting labs 1 week prior to follow-up visit with Dr. Ochoa.    FOLLOW-UP:  - Follow up with Dr. Ochoa for virtual visit to establish care.  - Follow up with " Dr. Ochoa in person 1 week after completing labs in March.        Problem List Items Addressed This Visit          Neuro    DDD (degenerative disc disease), lumbosacral  -The current medical regimen is effective;  continue present plan and medications.     Relevant Medications    diclofenac (VOLTAREN) 75 MG EC tablet       Psychiatric    Anxiety  -The current medical regimen is effective;  continue present plan and medications.        Cardiac/Vascular    Tachycardia, paroxysmal  -Stable; Monitor     Other Visit Diagnoses       Encounter for preventative adult health care examination    -  Primary    Relevant Orders    CBC Auto Differential    Comprehensive Metabolic Panel    Lipid Panel    TSH    Urinalysis    ADHD (attention deficit hyperactivity disorder), inattentive type        Relevant Medications    lisdexamfetamine (VYVANSE) 10 mg Cap    Needs flu shot        Relevant Medications    influenza (Flulaval, Fluzone, Fluarix) 45 mcg/0.5 mL IM vaccine (> or = 6 mo) 0.5 mL (Completed)              Healthcare Maintenance: Deferred at this time.       21+ minutes of total time spent on the encounter, which includes face to face time and non-face to face time preparing to see the patient (eg, review of tests), Obtaining and/or reviewing separately obtained history, documenting clinical information in the electronic or other health record, independently interpreting results (not separately reported) and communicating results to the patient/family/caregiver, or Care coordination (not separately reported). Visit today included increased complexity associated with the care of the episodic problem addressed and managing the longitudinal care of the patient due to the serious and/or complex managed problem(s).        Sincerely,  Alcides Monk NP

## 2024-12-23 DIAGNOSIS — F90.0 ADHD (ATTENTION DEFICIT HYPERACTIVITY DISORDER), INATTENTIVE TYPE: ICD-10-CM

## 2024-12-24 ENCOUNTER — PATIENT MESSAGE (OUTPATIENT)
Dept: PRIMARY CARE CLINIC | Facility: CLINIC | Age: 34
End: 2024-12-24
Payer: COMMERCIAL

## 2024-12-24 RX ORDER — ALFUZOSIN HYDROCHLORIDE 10 MG/1
10 TABLET, EXTENDED RELEASE ORAL
Qty: 30 TABLET | Refills: 5 | Status: SHIPPED | OUTPATIENT
Start: 2024-12-24 | End: 2025-06-22

## 2024-12-24 RX ORDER — LISDEXAMFETAMINE DIMESYLATE 10 MG/1
10 CAPSULE ORAL DAILY
Qty: 30 CAPSULE | Refills: 0 | Status: SHIPPED | OUTPATIENT
Start: 2024-12-24

## 2025-02-25 DIAGNOSIS — F90.0 ADHD (ATTENTION DEFICIT HYPERACTIVITY DISORDER), INATTENTIVE TYPE: ICD-10-CM

## 2025-02-25 DIAGNOSIS — M51.370 DEGENERATION OF INTERVERTEBRAL DISC OF LUMBOSACRAL REGION WITH DISCOGENIC BACK PAIN: ICD-10-CM

## 2025-02-25 RX ORDER — DICLOFENAC SODIUM 75 MG/1
75 TABLET, DELAYED RELEASE ORAL 2 TIMES DAILY PRN
Qty: 90 TABLET | Refills: 0 | Status: SHIPPED | OUTPATIENT
Start: 2025-02-25

## 2025-02-25 RX ORDER — LISDEXAMFETAMINE DIMESYLATE 10 MG/1
10 CAPSULE ORAL DAILY
Qty: 30 CAPSULE | Refills: 0 | Status: SHIPPED | OUTPATIENT
Start: 2025-02-25

## 2025-03-25 ENCOUNTER — OFFICE VISIT (OUTPATIENT)
Dept: PRIMARY CARE CLINIC | Facility: CLINIC | Age: 35
End: 2025-03-25
Payer: COMMERCIAL

## 2025-03-25 VITALS — SYSTOLIC BLOOD PRESSURE: 124 MMHG | DIASTOLIC BLOOD PRESSURE: 74 MMHG

## 2025-03-25 DIAGNOSIS — F90.0 ADHD (ATTENTION DEFICIT HYPERACTIVITY DISORDER), INATTENTIVE TYPE: Primary | ICD-10-CM

## 2025-03-25 DIAGNOSIS — F41.9 ANXIETY: ICD-10-CM

## 2025-03-25 RX ORDER — LISDEXAMFETAMINE DIMESYLATE 10 MG/1
10 CAPSULE ORAL DAILY
Qty: 30 CAPSULE | Refills: 0 | Status: SHIPPED | OUTPATIENT
Start: 2025-03-25

## 2025-03-25 NOTE — PROGRESS NOTES
Patient ID: Clinton Combs is a 34 y.o. male.    Chief Complaint: No chief complaint on file.    History of Present Illness    Mr. Combs presents today for Vyvanse refill    He reports that a one-month supply of Vyvanse 10 mg typically lasts 2-2.5 months. He states that Vyvanse helps with anxiety management a swell.     He denies urinary changes, headaches, or vision changes. He reports adequate hydration.  Blood pressure 124/74.     ROS:  General: -fever, -chills, -fatigue, -weight gain, -weight loss  Eyes: -vision changes, -redness, -discharge  ENT: -ear pain, -nasal congestion, -sore throat  Cardiovascular: -chest pain, -palpitations, -lower extremity edema  Respiratory: -cough, -shortness of breath  Gastrointestinal: -abdominal pain, -nausea, -vomiting, -diarrhea, -constipation, -blood in stool  Genitourinary: -dysuria, -hematuria, -frequency  Musculoskeletal: -joint pain, -muscle pain  Skin: -rash, -lesion  Neurological: -headache, -dizziness, -numbness, -tingling  Psychiatric: +anxiety, -depression, -sleep difficulty         Wt Readings from Last 10 Encounters:   11/27/24 81 kg (178 lb 7.4 oz)   09/12/24 74.9 kg (165 lb 2 oz)   07/11/24 74.9 kg (165 lb 2 oz)   05/30/24 74.9 kg (165 lb 2 oz)   05/30/24 76.2 kg (167 lb 15.9 oz)   05/10/24 76 kg (167 lb 8.8 oz)   06/05/23 74.6 kg (164 lb 5.7 oz)   12/05/22 75.1 kg (165 lb 9.1 oz)   10/06/22 72.6 kg (160 lb)   09/20/22 70.8 kg (156 lb 3.1 oz)       The ASCVD Risk score (Page KHALIL, et al., 2019) failed to calculate for the following reasons:    The 2019 ASCVD risk score is only valid for ages 40 to 79    PAST MEDICAL HISTORY:  Past Medical History:   Diagnosis Date    ELE positive        PAST SURGICAL HISTORY:  Past Surgical History:   Procedure Laterality Date    back injections         SOCIAL HISTORY:  Social History[1]    FAMILY HISTORY:  Family History   Problem Relation Name Age of Onset    Diabetes Father          prediabetes     Alzheimer's disease  "Maternal Grandmother      Heart attack Maternal Grandfather         ALLERGIES AND MEDICATIONS: updated and reviewed.  Review of patient's allergies indicates:  No Known Allergies  Current Medications[2]      Physical Exam  Constitutional:       Appearance: Normal appearance.   HENT:      Head: Normocephalic and atraumatic.   Pulmonary:      Effort: Pulmonary effort is normal.   Neurological:      Mental Status: He is alert and oriented to person, place, and time.   Psychiatric:         Mood and Affect: Mood normal.          Assessment:   LABS:   No results found for: "HGBA1C"   Lab Results   Component Value Date    CHOL 177 02/11/2021     Lab Results   Component Value Date    LDLCALC 96 08/16/2022    LDLCALC 94.4 02/11/2021     Lab Results   Component Value Date    WBC 10.13 05/10/2024    HGB 14.7 05/10/2024    HCT 44.5 05/10/2024     05/10/2024    CHOL 177 02/11/2021    TRIG 66 08/16/2022    HDL 74 02/11/2021    ALT 26 05/10/2024    AST 19 05/10/2024     05/10/2024    K 4.3 05/10/2024     05/10/2024    CREATININE 1.0 05/10/2024    BUN 15 05/10/2024    CO2 25 05/10/2024    TSH 1.94 02/17/2025    PSA 0.55 02/17/2025       Plan:   Diagnoses and all orders for this visit:    ADHD (attention deficit hyperactivity disorder), inattentive type  -     lisdexamfetamine (VYVANSE) 10 mg Cap; Take 1 capsule (10 mg total) by mouth once daily.    Anxiety  -     lisdexamfetamine (VYVANSE) 10 mg Cap; Take 1 capsule (10 mg total) by mouth once daily.        Assessment & Plan      IMPRESSION:  -BP WNL.  -Vyvanse lasts longer than prescribed and provides additional benefit in managing anxiety.  -Evaluated for potential side effects of Vyvanse, including urinary changes, headaches, and vision changes.  -Confirmed adequate hydration.  -Stable labs from the last 3 months.  -Checked Prescription Monitoring Program ().    ANXIETY DISORDER:  - Noted that the patient reports Vyvanse helps with management of " anxiety.    GENERAL HEALTH MONITORING:  - Measured blood pressure at 124/74.  - Confirmed that the patient denies headaches and changes in vision.  - Reviewed stable labs from the past 3 months.    MEDICATION MANAGEMENT:  - Checked the Prescription Monitoring Program ().  - Prescribed refills of Vyvanse 10 mg, with a 1-month supply typically lasting the patient at least 2 months.  - Updated medication list.       FOLLOW-UP:  - One month with Dr. Ochoa as previously scheduled.       During this audiovisual appt patient appears to be parked in a vehicle.    Each patient to whom he or she provides medical services by telemedicine is:  (1) informed of the relationship between the health care provider and patient and the respective role of any other health care provider with respect to management of the patient; and (2) notified that he or she may decline to receive medical services by telemedicine and may withdraw from such care at any time.    I spent a total of 12 minutes face to face and non-face to face on the date of this visit.This includes time preparing to see the patient (eg, review of tests, notes), obtaining and/or reviewing additional history from an independent historian and/or outside medical records, documenting clinical information in the electronic health record, independently interpreting results and/or communicating results to the patient/family/caregiver, or care coordinator.  Visit today included increased complexity associated with the care of the episodic problem addressed and managing the longitudinal care of the patient due to the serious and/or complex managed problem(s).      This note was generated with the assistance of ambient listening technology. Verbal consent was obtained by the patient and accompanying visitor(s) for the recording of patient appointment to facilitate this note. I attest to having reviewed and edited the generated note for accuracy, though some syntax or spelling  errors may persist. Please contact the author of this note for any clarification.      Sincerely,  Alcides Monk NP   Answers submitted by the patient for this visit:  Review of Systems Questionnaire (Submitted on 3/24/2025)  activity change: No  unexpected weight change: No  neck pain: No  hearing loss: No  rhinorrhea: No  trouble swallowing: No  eye discharge: No  visual disturbance: No  chest tightness: No  wheezing: No  chest pain: No  palpitations: No  blood in stool: No  constipation: No  vomiting: No  diarrhea: No  polydipsia: No  polyuria: No  difficulty urinating: No  urgency: No  hematuria: No  joint swelling: No  arthralgias: Yes  headaches: No  weakness: No  confusion: No  dysphoric mood: No         [1]   Social History  Socioeconomic History    Marital status: Single    Number of children: 0   Occupational History     Comment:     Tobacco Use    Smoking status: Never     Passive exposure: Never    Smokeless tobacco: Never   Substance and Sexual Activity    Alcohol use: Yes    Drug use: Never    Sexual activity: Not Currently     Social Drivers of Health     Financial Resource Strain: Low Risk  (3/24/2025)    Overall Financial Resource Strain (CARDIA)     Difficulty of Paying Living Expenses: Not hard at all   Food Insecurity: No Food Insecurity (3/24/2025)    Hunger Vital Sign     Worried About Running Out of Food in the Last Year: Never true     Ran Out of Food in the Last Year: Never true   Transportation Needs: No Transportation Needs (3/24/2025)    PRAPARE - Transportation     Lack of Transportation (Medical): No     Lack of Transportation (Non-Medical): No   Physical Activity: Sufficiently Active (3/24/2025)    Exercise Vital Sign     Days of Exercise per Week: 6 days     Minutes of Exercise per Session: 40 min   Stress: No Stress Concern Present (3/24/2025)    [employer] [employer] - Occupational Stress Questionnaire     Feeling of Stress : Only a little   Housing  Stability: Low Risk  (3/24/2025)    Housing Stability Vital Sign     Unable to Pay for Housing in the Last Year: No     Number of Times Moved in the Last Year: 0     Homeless in the Last Year: No   [2]   Current Outpatient Medications   Medication Sig Dispense Refill    alfuzosin (UROXATRAL) 10 mg Tb24 Take 1 tablet (10 mg total) by mouth daily with breakfast. 30 tablet 5    ciprofloxacin HCl (CIPRO) 500 MG tablet Take 1 tablet (500 mg total) by mouth every 12 (twelve) hours. 60 tablet 0    diclofenac (VOLTAREN) 75 MG EC tablet TAKE 1 TABLET BY MOUTH 2 TIMES DAILY AS NEEDED. 90 tablet 0    lisdexamfetamine (VYVANSE) 10 mg Cap Take 1 capsule (10 mg total) by mouth once daily. 30 capsule 0     No current facility-administered medications for this visit.

## 2025-04-04 RX ORDER — ALFUZOSIN HYDROCHLORIDE 10 MG/1
10 TABLET, EXTENDED RELEASE ORAL
Qty: 30 TABLET | Refills: 1 | Status: SHIPPED | OUTPATIENT
Start: 2025-04-04 | End: 2025-10-01

## 2025-04-04 NOTE — TELEPHONE ENCOUNTER
MA received medication refill for alfuzosin (UROXATRAL) 10 mg Tb24. Last office visit was 9/12/24 with instructions to Take 1 tablet (10 mg total) by mouth daily with breakfast . Follow up homa scheduled for 3/13/25 which was canceled.Last prescription for alfuzosin (UROXATRAL) 10 mg Tb24 was sent to the pharmacy on 12/24/24 with 5 refills. Prescription routed to provider.    Please see the attached refill request.

## 2025-06-17 DIAGNOSIS — M51.370 DEGENERATION OF INTERVERTEBRAL DISC OF LUMBOSACRAL REGION WITH DISCOGENIC BACK PAIN: ICD-10-CM

## 2025-06-18 ENCOUNTER — TELEPHONE (OUTPATIENT)
Dept: UROLOGY | Facility: CLINIC | Age: 35
End: 2025-06-18
Payer: COMMERCIAL

## 2025-06-18 RX ORDER — DICLOFENAC SODIUM 75 MG/1
75 TABLET, DELAYED RELEASE ORAL 2 TIMES DAILY PRN
Qty: 90 TABLET | Refills: 0 | Status: SHIPPED | OUTPATIENT
Start: 2025-06-18

## 2025-06-18 RX ORDER — ALFUZOSIN HYDROCHLORIDE 10 MG/1
10 TABLET, EXTENDED RELEASE ORAL
Qty: 30 TABLET | Refills: 1 | OUTPATIENT
Start: 2025-06-18 | End: 2025-12-15

## 2025-06-18 NOTE — TELEPHONE ENCOUNTER
MA called patient to schedule  an homa per Dr. Lopes. Patient didn't answer, left a voicemail for him to call back to schedule.

## 2025-06-18 NOTE — TELEPHONE ENCOUNTER
MA received medication refill for alfuzosin (UROXATRAL) 10 mg Tb24. Last office visit was 9/12/24 with instructions to Take 1 tablet (10 mg total) by mouth daily with breakfast. Follow up homa scheduled for 3/13/25 which was canceled .Last prescription for alfuzosin (UROXATRAL) 10 mg Tb24 was sent to the pharmacy on 4/4/25 with 1 refills. Prescription routed to provider.    Please see the attached refill request.